# Patient Record
Sex: FEMALE | Race: BLACK OR AFRICAN AMERICAN | NOT HISPANIC OR LATINO | Employment: OTHER | URBAN - METROPOLITAN AREA
[De-identification: names, ages, dates, MRNs, and addresses within clinical notes are randomized per-mention and may not be internally consistent; named-entity substitution may affect disease eponyms.]

---

## 2018-01-15 DIAGNOSIS — Z36.89 ENCOUNTER TO ESTABLISH GESTATIONAL AGE USING ULTRASOUND: Primary | ICD-10-CM

## 2018-01-24 ENCOUNTER — INITIAL PRENATAL (OUTPATIENT)
Dept: OBSTETRICS AND GYNECOLOGY | Facility: CLINIC | Age: 23
End: 2018-01-24

## 2018-01-24 ENCOUNTER — TRANSCRIBE ORDERS (OUTPATIENT)
Dept: LAB | Facility: HOSPITAL | Age: 23
End: 2018-01-24

## 2018-01-24 ENCOUNTER — APPOINTMENT (OUTPATIENT)
Dept: LAB | Facility: HOSPITAL | Age: 23
End: 2018-01-24

## 2018-01-24 VITALS — WEIGHT: 222 LBS | SYSTOLIC BLOOD PRESSURE: 126 MMHG | DIASTOLIC BLOOD PRESSURE: 82 MMHG | BODY MASS INDEX: 38.11 KG/M2

## 2018-01-24 DIAGNOSIS — Z98.891 PREVIOUS CESAREAN SECTION: ICD-10-CM

## 2018-01-24 DIAGNOSIS — Z00.00 ROUTINE GENERAL MEDICAL EXAMINATION AT A HEALTH CARE FACILITY: Primary | ICD-10-CM

## 2018-01-24 DIAGNOSIS — Z34.82 PRENATAL CARE, SUBSEQUENT PREGNANCY, SECOND TRIMESTER: Primary | ICD-10-CM

## 2018-01-24 DIAGNOSIS — Z36.9 ANTENATAL SCREENING ENCOUNTER: ICD-10-CM

## 2018-01-24 DIAGNOSIS — Z36.3 ENCOUNTER FOR ANTENATAL SCREENING FOR MALFORMATIONS: ICD-10-CM

## 2018-01-24 LAB
ABO GROUP BLD: NORMAL
BASOPHILS # BLD AUTO: 0.01 10*3/MM3 (ref 0–0.2)
BASOPHILS NFR BLD AUTO: 0.1 % (ref 0–1)
BILIRUB UR QL STRIP: NEGATIVE
BLD GP AB SCN SERPL QL: NEGATIVE
CLARITY UR: CLEAR
COLOR UR: YELLOW
DEPRECATED RDW RBC AUTO: 41.5 FL (ref 37–54)
EOSINOPHIL # BLD AUTO: 0.08 10*3/MM3 (ref 0–0.3)
EOSINOPHIL NFR BLD AUTO: 1 % (ref 0–3)
ERYTHROCYTE [DISTWIDTH] IN BLOOD BY AUTOMATED COUNT: 13.8 % (ref 11.3–14.5)
GLUCOSE UR STRIP-MCNC: NEGATIVE MG/DL
HBA1C MFR BLD: 6.1 % (ref 4.8–5.6)
HBV SURFACE AG SERPL QL IA: NORMAL
HCT VFR BLD AUTO: 37.1 % (ref 34.5–44)
HCV AB SER DONR QL: NORMAL
HGB BLD-MCNC: 12.2 G/DL (ref 11.5–15.5)
HGB UR QL STRIP.AUTO: NEGATIVE
HIV1+2 AB SER QL: NORMAL
IMM GRANULOCYTES # BLD: 0.1 10*3/MM3 (ref 0–0.03)
IMM GRANULOCYTES NFR BLD: 1.2 % (ref 0–0.6)
KETONES UR QL STRIP: NEGATIVE
LEUKOCYTE ESTERASE UR QL STRIP.AUTO: NEGATIVE
LYMPHOCYTES # BLD AUTO: 2.32 10*3/MM3 (ref 0.6–4.8)
LYMPHOCYTES NFR BLD AUTO: 28.9 % (ref 24–44)
MCH RBC QN AUTO: 27.6 PG (ref 27–31)
MCHC RBC AUTO-ENTMCNC: 32.9 G/DL (ref 32–36)
MCV RBC AUTO: 83.9 FL (ref 80–99)
MONOCYTES # BLD AUTO: 0.42 10*3/MM3 (ref 0–1)
MONOCYTES NFR BLD AUTO: 5.2 % (ref 0–12)
NEUTROPHILS # BLD AUTO: 5.09 10*3/MM3 (ref 1.5–8.3)
NEUTROPHILS NFR BLD AUTO: 63.6 % (ref 41–71)
NITRITE UR QL STRIP: NEGATIVE
PH UR STRIP.AUTO: 6 [PH] (ref 5–8)
PLATELET # BLD AUTO: 237 10*3/MM3 (ref 150–450)
PMV BLD AUTO: 10.7 FL (ref 6–12)
PROT UR QL STRIP: NEGATIVE
RBC # BLD AUTO: 4.42 10*6/MM3 (ref 3.89–5.14)
RH BLD: POSITIVE
RUBV IGG SER QL: NORMAL
RUBV IGG SER-ACNC: 131.8 IU/ML
SP GR UR STRIP: 1.02 (ref 1–1.03)
TSH SERPL DL<=0.05 MIU/L-ACNC: 1.01 MIU/ML (ref 0.35–5.35)
UROBILINOGEN UR QL STRIP: NORMAL
WBC NRBC COR # BLD: 8.02 10*3/MM3 (ref 3.5–10.8)

## 2018-01-24 PROCEDURE — 83036 HEMOGLOBIN GLYCOSYLATED A1C: CPT | Performed by: OBSTETRICS & GYNECOLOGY

## 2018-01-24 PROCEDURE — 84702 CHORIONIC GONADOTROPIN TEST: CPT | Performed by: OBSTETRICS & GYNECOLOGY

## 2018-01-24 PROCEDURE — 82105 ALPHA-FETOPROTEIN SERUM: CPT | Performed by: OBSTETRICS & GYNECOLOGY

## 2018-01-24 PROCEDURE — 81003 URINALYSIS AUTO W/O SCOPE: CPT | Performed by: OBSTETRICS & GYNECOLOGY

## 2018-01-24 PROCEDURE — 82677 ASSAY OF ESTRIOL: CPT | Performed by: OBSTETRICS & GYNECOLOGY

## 2018-01-24 PROCEDURE — 86803 HEPATITIS C AB TEST: CPT | Performed by: OBSTETRICS & GYNECOLOGY

## 2018-01-24 PROCEDURE — 85025 COMPLETE CBC W/AUTO DIFF WBC: CPT | Performed by: OBSTETRICS & GYNECOLOGY

## 2018-01-24 PROCEDURE — 86850 RBC ANTIBODY SCREEN: CPT | Performed by: OBSTETRICS & GYNECOLOGY

## 2018-01-24 PROCEDURE — 99214 OFFICE O/P EST MOD 30 MIN: CPT | Performed by: OBSTETRICS & GYNECOLOGY

## 2018-01-24 PROCEDURE — G0432 EIA HIV-1/HIV-2 SCREEN: HCPCS | Performed by: OBSTETRICS & GYNECOLOGY

## 2018-01-24 PROCEDURE — 86901 BLOOD TYPING SEROLOGIC RH(D): CPT | Performed by: OBSTETRICS & GYNECOLOGY

## 2018-01-24 PROCEDURE — 36415 COLL VENOUS BLD VENIPUNCTURE: CPT | Performed by: OBSTETRICS & GYNECOLOGY

## 2018-01-24 PROCEDURE — 84443 ASSAY THYROID STIM HORMONE: CPT | Performed by: OBSTETRICS & GYNECOLOGY

## 2018-01-24 PROCEDURE — 80081 OBSTETRIC PANEL INC HIV TSTG: CPT | Performed by: OBSTETRICS & GYNECOLOGY

## 2018-01-24 PROCEDURE — 87340 HEPATITIS B SURFACE AG IA: CPT | Performed by: OBSTETRICS & GYNECOLOGY

## 2018-01-24 PROCEDURE — 86900 BLOOD TYPING SEROLOGIC ABO: CPT | Performed by: OBSTETRICS & GYNECOLOGY

## 2018-01-24 PROCEDURE — 86762 RUBELLA ANTIBODY: CPT | Performed by: OBSTETRICS & GYNECOLOGY

## 2018-01-24 PROCEDURE — 86336 INHIBIN A: CPT | Performed by: OBSTETRICS & GYNECOLOGY

## 2018-01-24 RX ORDER — PNV NO.95/FERROUS FUM/FOLIC AC 28MG-0.8MG
1 TABLET ORAL DAILY
Qty: 30 TABLET | Refills: 12 | Status: SHIPPED | OUTPATIENT
Start: 2018-01-24 | End: 2018-02-28 | Stop reason: ALTCHOICE

## 2018-01-24 NOTE — PROGRESS NOTES
@SUBJECTIVEBEGIN  Chief Complaint   Patient presents with   • Initial Prenatal Visit     c/o heartburn, nausea that comes and goes, LMP-1st week of 2017       Marcela Perales is a 22 y.o. year old .  Patient's last menstrual period was 10/06/2017..  She presents to be seen to initiate prenatal care.  She complains of breast tenderness, morning sickness and nausea. These symptoms have been occurring for approximately 3 months.  She states that nothing helps to alleviate her symptoms.  Pre-existing conditions that could possibly affect the pregnancy are diabetes.      Past Medical History:   Diagnosis Date   • Acid reflux    ,   Past Surgical History:   Procedure Laterality Date   • WISDOM TOOTH EXTRACTION     ,   Family History   Problem Relation Age of Onset   • Diabetes Mother    • Hypertension Mother    • Diabetes Maternal Aunt    • Diabetes Maternal Uncle    • Diabetes Maternal Grandmother    • No Known Problems Father    • Breast cancer Neg Hx    • Ovarian cancer Neg Hx    • Uterine cancer Neg Hx    • Colon cancer Neg Hx    ,   Social History   Substance Use Topics   • Smoking status: Former Smoker     Packs/day: 0.50     Types: Cigarettes     Quit date: 3/16/2016   • Smokeless tobacco: Never Used   • Alcohol use No   ,   (Not in a hospital admission) and Allergies:  Review of patient's allergies indicates no known allergies.    Smoking status: Former Smoker                                                              Packs/day: 0.50      Years: 0.00         Types: Cigarettes     Quit date: 3/16/2016  Smokeless status: Never Used                          The following portions of the patient's history were reviewed and updated as appropriate:vital signs, allergies, current medications, past medical history, past social history, past surgical history and problem list.    Objective     Imaging   No data reviewed    Review of Systems - History obtained from the patient  General ROS: positive for  -  weight gain  Psychological ROS: negative  ENT ROS: negative  Allergy and Immunology ROS: negative  Hematological and Lymphatic ROS: negative  Endocrine ROS: positive for - gestational diabetes last pregnancy  Breast ROS: negative for breast lumps  Respiratory ROS: no cough, shortness of breath, or wheezing  Cardiovascular ROS: no chest pain or dyspnea on exertion  Gastrointestinal ROS: positive for - nausea/vomiting  Genito-Urinary ROS: no dysuria, trouble voiding, or hematuria  Musculoskeletal ROS: negative  Neurological ROS: no TIA or stroke symptoms  Dermatological ROS: negative     Physical Exam:  See Episode    Assessment/Plan   ASSESSMENT  Marcela was seen today for initial prenatal visit.    Diagnoses and all orders for this visit:    Prenatal care, subsequent pregnancy, second trimester  -     Obstetric Panel  -     Hepatitis C Antibody  -     HIV-1 / O / 2 Ag / Antibody 4th Generation  -     TSH  -     Hemoglobin A1c  -     Chlamydia trachomatis, Neisseria gonorrhoeae, Trichomonas vaginalis, PCR - Swab, Vagina  -     Urinalysis With / Culture If Indicated - Urine, Clean Catch  -     Liquid-based Pap Smear, Screening - ThinPrep Vial, Cervix    Previous  section     screening encounter  -     Maternal Screen 4    Encounter for  screening for malformations  -     [facility] [place]; Future    Other orders  -     Prenatal Vit-Fe Fumarate-FA (PRENATAL VITAMIN) 27-0.8 MG tablet; Take 1 tablet by mouth Daily.        PLAN  1. Tests ordered today:  Orders Placed This Encounter   Procedures   • Chlamydia trachomatis, Neisseria gonorrhoeae, Trichomonas vaginalis, PCR - Swab, Vagina   • [facility]     Standing Status:   Future     Standing Expiration Date:   2019     Order Specific Question:   Reason for Exam:     Answer:   Anatomical scan   • Obstetric Panel   • Hepatitis C Antibody   • HIV-1 / O / 2 Ag / Antibody 4th Generation   • TSH   •  Hemoglobin A1c   • Urinalysis With / Culture If Indicated - Urine, Clean Catch   • Maternal Screen 4     Order Specific Question:   [facility] Patient weight (lbs):     Answer:   101     2. Medications prescribed today:  New Medications Ordered This Visit   Medications   • Prenatal Vit-Fe Fumarate-FA (PRENATAL VITAMIN) 27-0.8 MG tablet     Sig: Take 1 tablet by mouth Daily.     Dispense:  30 tablet     Refill:  12     3. Information reviewed: exercise in pregnancy, nutrition in pregnancy, weight gain in pregnancy, work and travel restrictions during pregnancy, list of OTC medications acceptable in pregnancy and call coverage groups  4. Genetic testing reviewed: wants screening  5. The problem list for pregnancy was initiated today    Follow up: 4 week(s)       This note was electronically signed.    Juan Avilez MD   January 24, 2018

## 2018-01-26 LAB — RPR SER QL: NORMAL

## 2018-01-29 LAB — REF LAB TEST METHOD: NORMAL

## 2018-02-21 ENCOUNTER — APPOINTMENT (OUTPATIENT)
Dept: WOMENS IMAGING | Facility: HOSPITAL | Age: 23
End: 2018-02-21
Attending: OBSTETRICS & GYNECOLOGY

## 2018-02-28 ENCOUNTER — ROUTINE PRENATAL (OUTPATIENT)
Dept: OBSTETRICS AND GYNECOLOGY | Facility: CLINIC | Age: 23
End: 2018-02-28

## 2018-02-28 ENCOUNTER — HOSPITAL ENCOUNTER (OUTPATIENT)
Dept: WOMENS IMAGING | Facility: HOSPITAL | Age: 23
Discharge: HOME | End: 2018-02-28
Attending: OBSTETRICS & GYNECOLOGY | Admitting: OBSTETRICS & GYNECOLOGY

## 2018-02-28 ENCOUNTER — OFFICE VISIT (OUTPATIENT)
Dept: OBSTETRICS AND GYNECOLOGY | Facility: HOSPITAL | Age: 23
End: 2018-02-28

## 2018-02-28 VITALS
DIASTOLIC BLOOD PRESSURE: 71 MMHG | SYSTOLIC BLOOD PRESSURE: 117 MMHG | BODY MASS INDEX: 38.35 KG/M2 | HEIGHT: 65 IN | WEIGHT: 230.2 LBS

## 2018-02-28 VITALS — WEIGHT: 234 LBS | DIASTOLIC BLOOD PRESSURE: 80 MMHG | SYSTOLIC BLOOD PRESSURE: 122 MMHG | BODY MASS INDEX: 39.55 KG/M2

## 2018-02-28 DIAGNOSIS — O34.219 PREVIOUS CESAREAN DELIVERY AFFECTING PREGNANCY: ICD-10-CM

## 2018-02-28 DIAGNOSIS — Z3A.20 20 WEEKS GESTATION OF PREGNANCY: Primary | ICD-10-CM

## 2018-02-28 DIAGNOSIS — Z36.9 ANTENATAL SCREENING ENCOUNTER: ICD-10-CM

## 2018-02-28 DIAGNOSIS — Z36.3 ENCOUNTER FOR ANTENATAL SCREENING FOR MALFORMATIONS: ICD-10-CM

## 2018-02-28 DIAGNOSIS — Z34.82 PRENATAL CARE, SUBSEQUENT PREGNANCY, SECOND TRIMESTER: Primary | ICD-10-CM

## 2018-02-28 PROCEDURE — 76811 OB US DETAILED SNGL FETUS: CPT

## 2018-02-28 PROCEDURE — 99212 OFFICE O/P EST SF 10 MIN: CPT | Performed by: OBSTETRICS & GYNECOLOGY

## 2018-02-28 PROCEDURE — 76811 OB US DETAILED SNGL FETUS: CPT | Performed by: OBSTETRICS & GYNECOLOGY

## 2018-02-28 RX ORDER — VITAMIN A ACETATE, .BETA.-CAROTENE, ASCORBIC ACID, CHOLECALCIFEROL, .ALPHA.-TOCOPHEROL ACETATE, DL-, THIAMINE MONONITRATE, RIBOFLAVIN, NIACINAMIDE, PYRIDOXINE HYDROCHLORIDE, FOLIC ACID, CYANOCOBALAMIN, CALCIUM CARBONATE, FERROUS FUMARATE, ZINC OXIDE, AND CUPRIC OXIDE 2000; 2000; 120; 400; 22; 1.84; 3; 20; 10; 1; 12; 200; 27; 25; 2 [IU]/1; [IU]/1; MG/1; [IU]/1; MG/1; MG/1; MG/1; MG/1; MG/1; MG/1; UG/1; MG/1; MG/1; MG/1; MG/1
1 TABLET ORAL DAILY
Refills: 1 | COMMUNITY
Start: 2018-02-13 | End: 2021-11-02

## 2018-02-28 NOTE — PROGRESS NOTES
Denies problems. Had negative MSAFP4. States still has glucometer from previous pregnancy and occasionally checks glucose values (). To see Dr. Avilez today.

## 2018-02-28 NOTE — PROGRESS NOTES
Lab Results   Component Value Date    ABORH B Rh Positive 2016    LABANTI Negative 2016       Chief Complaint   Patient presents with   • Routine Prenatal Visit     US at [facility] today    • Back Pain       Today Marcela complains of right hip pain  See ob flowsheet for physical exam.    Prenatal Assessment  Fetal Heart Rate: 153  Fundal Height (cm): 25 cm  Movement: Present  Prenatal Vitals  BP: 122/80  Weight: 106 kg (234 lb)  Vaginal Drainage  Draining Fluid: No  Edema  LLE Edema: None  RLE Edema: None  Facial Edema: None     Tests done today: U/S to complete the anatomic screening - U/S report is not available for review at this time  At the time of the next visit, she will need to have GCT    Impression:   Encounter Diagnoses   Name Primary?   • Prenatal care, subsequent pregnancy, second trimester Yes   • Previous  delivery affecting pregnancy    •  screening encounter        Plan: Return 2 weeks, Glucola next visit    This note was electronically signed.    Juan Avilez MD

## 2018-04-10 ENCOUNTER — ROUTINE PRENATAL (OUTPATIENT)
Dept: OBSTETRICS AND GYNECOLOGY | Facility: CLINIC | Age: 23
End: 2018-04-10

## 2018-04-10 VITALS — BODY MASS INDEX: 39.38 KG/M2 | SYSTOLIC BLOOD PRESSURE: 122 MMHG | WEIGHT: 233 LBS | DIASTOLIC BLOOD PRESSURE: 70 MMHG

## 2018-04-10 DIAGNOSIS — Z34.83 PRENATAL CARE, SUBSEQUENT PREGNANCY, THIRD TRIMESTER: Primary | ICD-10-CM

## 2018-04-10 DIAGNOSIS — O26.843 UTERINE SIZE DATE DISCREPANCY PREGNANCY, THIRD TRIMESTER: ICD-10-CM

## 2018-04-10 DIAGNOSIS — O34.219 PREVIOUS CESAREAN DELIVERY AFFECTING PREGNANCY: ICD-10-CM

## 2018-04-10 PROCEDURE — 99212 OFFICE O/P EST SF 10 MIN: CPT | Performed by: OBSTETRICS & GYNECOLOGY

## 2018-04-16 ENCOUNTER — HOSPITAL ENCOUNTER (OUTPATIENT)
Facility: HOSPITAL | Age: 23
Discharge: HOME | End: 2018-04-17
Attending: OBSTETRICS & GYNECOLOGY | Admitting: OBSTETRICS & GYNECOLOGY

## 2018-04-16 LAB
DEPRECATED RDW RBC AUTO: 40.3 FL (ref 37–54)
ERYTHROCYTE [DISTWIDTH] IN BLOOD BY AUTOMATED COUNT: 13.3 % (ref 11.3–14.5)
HCT VFR BLD AUTO: 34.6 % (ref 34.5–44)
HGB BLD-MCNC: 11.4 G/DL (ref 11.5–15.5)
MCH RBC QN AUTO: 27.5 PG (ref 27–31)
MCHC RBC AUTO-ENTMCNC: 32.9 G/DL (ref 32–36)
MCV RBC AUTO: 83.6 FL (ref 80–99)
PLATELET # BLD AUTO: 244 10*3/MM3 (ref 150–450)
PMV BLD AUTO: 11.1 FL (ref 6–12)
RBC # BLD AUTO: 4.14 10*6/MM3 (ref 3.89–5.14)
WBC NRBC COR # BLD: 10.78 10*3/MM3 (ref 3.5–10.8)

## 2018-04-16 PROCEDURE — 85027 COMPLETE CBC AUTOMATED: CPT | Performed by: OBSTETRICS & GYNECOLOGY

## 2018-04-16 PROCEDURE — 59025 FETAL NON-STRESS TEST: CPT

## 2018-04-17 VITALS
BODY MASS INDEX: 38.93 KG/M2 | WEIGHT: 228 LBS | TEMPERATURE: 98.3 F | SYSTOLIC BLOOD PRESSURE: 130 MMHG | HEIGHT: 64 IN | HEART RATE: 100 BPM | DIASTOLIC BLOOD PRESSURE: 75 MMHG | RESPIRATION RATE: 16 BRPM

## 2018-04-17 PROCEDURE — 99213 OFFICE O/P EST LOW 20 MIN: CPT | Performed by: OBSTETRICS & GYNECOLOGY

## 2018-04-17 PROCEDURE — G0463 HOSPITAL OUTPT CLINIC VISIT: HCPCS

## 2018-04-17 NOTE — H&P
[facility]  Obstetric History and Physical    Chief Complaint   Patient presents with   • Rectal Bleeding     bleeding from rectum with BM on Saturday and today.       Subjective     Patient is a 23 y.o. female  currently at 29w4d, who presents with a concern after having 2 episodes of rectal bleeding during straining with a bowel movement.  She thought it might be hemorrhoids, but was scared with the bright red bleeding and wanted to be sure.  She has never had this before.  She does report that she is more constipated lately with sometimes going 2-3 days between stools.  She denies pain, feeling faint or more tired than usual.   She does not notice bleeding other than the two times that she mentioned.  Her CBC is not significantly abnormal.          The following portions of the patients history were reviewed and updated as appropriate: current medications, allergies, past medical history, past surgical history, past family history, past social history and problem list .       Prenatal Information:   Maternal Prenatal Labs  Blood Type No results found for: ABO   Rh Status No results found for: RH   Antibody Screen No results found for: ABSCRN   Gonnorhea No results found for: GCCX   Chlamydia No results found for: CLAMYDCU   RPR No results found for: RPR   Syphilis Antibody No results found for: SYPHILIS   Rubella No results found for: RUBELLAIGGIN   Hepatitis B Surface Antigen No results found for: HEPBSAG   HIV-1 Antibody No results found for: LABHIV1   Hepatitis C Antibody No results found for: HEPCAB   Rapid Urin Drug Screen No results found for: AMPMETHU, BARBITSCNUR, LABBENZSCN, LABMETHSCN, LABOPIASCN, THCURSCR, COCAINEUR, AMPHETSCREEN, PROPOXSCN, BUPRENORSCNU, METAMPSCNUR, OXYCODONESCN, TRICYCLICSCN   Group B Strep Culture No results found for: GBSANTIGEN           External Prenatal Results         Pregnancy Outside Results - these were transcribed from office records.  See scanned records for  details. Date Time   Hgb  11.4 g/dL (L) 18 2317   Hct  34.6 % 18 2317   ABO  B  18 1249   Rh  Positive  18 1249   Antibody Screen  Negative  18 1249   Glucose Fasting GTT      Glucose Tolerance Test 1 hour ^ 218  16    Glucose Tolerance Test 3 hour      Gonorrhea (discrete) ^ Negative  16    Chlamydia (discrete) ^ Negative  16    RPR  Non-Reactive  18 1249   VDRL      Syphillis Antibody      Rubella  131.8 IU/mL 18 1249      Immune  18 1249   HBsAg  Non-Reactive  18 1249   Herpes Simplex Virus PCR      Herpes Simplex VIrus Culture      HIV  Non-Reactive  18 1249   Hep C RNA Quant PCR      Hep C Antibody      AFP      Group B Strep ^ Negative  16    GBS Susceptibility to Clindamycin      GBS Susceptibility to Eythromycin      Fetal Fibronectin      Genetic Testing, Maternal Blood      Drug Screening Date Time   Urine Drug Screen ^ negative  16    Amphetamine Screen  Negative  16 1256   Barbiturate Screen  Negative  16 1256   Benzodiazepine Screen  Negative  16 1256   Methadone Screen  Negative  16 1256   Phencyclidine Screen  Negative  16 1256   Opiates Screen  Negative  16 1256   THC Screen  Negative  16 1256   Cocaine Screen      Propoxyphene Screen  Negative  16 1256   Buprenorphine Screen  Negative  16 1256   Methamphetamine Screen      Oxycodone Screen  Negative  16 1256   Tryicyclic Antidepressants Screen  Negative  16 1256             Legend: ^: Historical                       Past OB History:     Obstetric History       T1      L1     SAB0   TAB0   Ectopic0   Molar0   Multiple0   Live Births1       # Outcome Date GA Lbr Michele/2nd Weight Sex Delivery Anes PTL Lv   2 Current            1 Term 16 38w6d  3718 g (8 lb 3.2 oz) M CS-LTranv EPI N AYO      Name: JOSAFAT HACKETT      Complications: Failure to Progress in First Stage      Apgar1:   8                Apgar5: 9          Past Medical History: Past Medical History:   Diagnosis Date   • Acid reflux       Past Surgical History Past Surgical History:   Procedure Laterality Date   •  SECTION PRIMARY  2016   • WISDOM TOOTH EXTRACTION        Family History: Family History   Problem Relation Age of Onset   • Diabetes Mother    • Hypertension Mother    • Diabetes Maternal Aunt    • Diabetes Maternal Uncle    • Diabetes Maternal Grandmother    • No Known Problems Father    • Breast cancer Neg Hx    • Ovarian cancer Neg Hx    • Uterine cancer Neg Hx    • Colon cancer Neg Hx       Social History:  reports that she quit smoking about 2 years ago. Her smoking use included Cigarettes. She smoked 0.50 packs per day. She has never used smokeless tobacco.   reports that she does not drink alcohol.   reports that she does not use drugs.        Review of Systems  Denies fever, HA, CP, Shortness of air, muscle weakness,                                             and rashes      Objective     Vital Signs Range for the last 24 hours  Temperature: Temp:  [98.3 °F (36.8 °C)] 98.3 °F (36.8 °C)   Temp Source: Temp src: Oral   BP:  130/75   Pulse:  100   Respirations: Resp:  [16] 16   SPO2:     O2 Amount (l/min):     O2 Devices     Weight: Weight:  [103 kg (228 lb)] 103 kg (228 lb)     Physical Examination: General appearance - alert, well appearing, and in no distress and oriented to person, place, and time  Chest - clear to auscultation, no wheezes, rales or rhonchi, symmetric air entry  Heart - S1 and S2 normal, no murmurs noted  Abdomen - soft, nontender, nondistended, no masses or organomegaly  no rebound tenderness noted  bowel sounds normal  No guarding, No RUQ pain  Extremities - Non-tender                          Fetal Heart Rate Assessment   Method:     Beats/min:     Baseline:  130s   Varibility:     Accels:     Decels:     Tracing Category:       Uterine Assessment   Method:     Frequency (min):   none   Ctx Count in 10 min:     Duration:     Intensity:     Intensity by IUPC:     Resting Tone:     Resting Tone by IUPC:           Laboratory Results:   Lab Results   Component Value Date     04/16/2018    HGB 11.4 (L) 04/16/2018    HCT 34.6 04/16/2018    WBC 10.78 04/16/2018             Assessment:  1. Intrauterine pregnancy at 29w4d weeks gestation with reassuring fetal status  2. Hemorrhoids     Plan:  1. Reassuring fetal status  2. Hemorrhoids - Recommend taking 1 to 2 daily of Colace to keep from constiaption                               CBC is stable.     3.  All questions have been answered.  4.  D/C home with routine follow-up      Federico Gordon MD  4/17/2018  12:12 AM

## 2018-04-24 ENCOUNTER — OFFICE VISIT (OUTPATIENT)
Dept: OBSTETRICS AND GYNECOLOGY | Facility: HOSPITAL | Age: 23
End: 2018-04-24

## 2018-04-24 ENCOUNTER — ROUTINE PRENATAL (OUTPATIENT)
Dept: OBSTETRICS AND GYNECOLOGY | Facility: CLINIC | Age: 23
End: 2018-04-24

## 2018-04-24 ENCOUNTER — HOSPITAL ENCOUNTER (OUTPATIENT)
Dept: WOMENS IMAGING | Facility: HOSPITAL | Age: 23
Discharge: HOME | End: 2018-04-24
Attending: OBSTETRICS & GYNECOLOGY | Admitting: OBSTETRICS & GYNECOLOGY

## 2018-04-24 VITALS — SYSTOLIC BLOOD PRESSURE: 130 MMHG | DIASTOLIC BLOOD PRESSURE: 80 MMHG | WEIGHT: 243 LBS | BODY MASS INDEX: 41.71 KG/M2

## 2018-04-24 VITALS — WEIGHT: 240 LBS | DIASTOLIC BLOOD PRESSURE: 77 MMHG | BODY MASS INDEX: 41.2 KG/M2 | SYSTOLIC BLOOD PRESSURE: 119 MMHG

## 2018-04-24 DIAGNOSIS — O26.843 UTERINE SIZE-DATE DISCREPANCY IN THIRD TRIMESTER: Primary | ICD-10-CM

## 2018-04-24 DIAGNOSIS — O34.219 PREVIOUS CESAREAN DELIVERY AFFECTING PREGNANCY: ICD-10-CM

## 2018-04-24 DIAGNOSIS — O40.9XX0 POLYHYDRAMNIOS AFFECTING PREGNANCY: ICD-10-CM

## 2018-04-24 DIAGNOSIS — O26.843 UTERINE SIZE DATE DISCREPANCY PREGNANCY, THIRD TRIMESTER: ICD-10-CM

## 2018-04-24 DIAGNOSIS — Z34.83 PRENATAL CARE, SUBSEQUENT PREGNANCY, THIRD TRIMESTER: Primary | ICD-10-CM

## 2018-04-24 DIAGNOSIS — E66.01 MORBID OBESITY WITH BODY MASS INDEX (BMI) OF 40.0 TO 44.9 IN ADULT (HCC): ICD-10-CM

## 2018-04-24 DIAGNOSIS — O24.410 DIET CONTROLLED GESTATIONAL DIABETES MELLITUS (GDM) IN THIRD TRIMESTER: ICD-10-CM

## 2018-04-24 PROCEDURE — 99212 OFFICE O/P EST SF 10 MIN: CPT | Performed by: OBSTETRICS & GYNECOLOGY

## 2018-04-24 PROCEDURE — 76816 OB US FOLLOW-UP PER FETUS: CPT | Performed by: OBSTETRICS & GYNECOLOGY

## 2018-04-24 PROCEDURE — 76816 OB US FOLLOW-UP PER FETUS: CPT

## 2018-04-24 NOTE — PROGRESS NOTES
Lab Results   Component Value Date    ABORH B Rh Positive 2016    LABANTI Negative 2016       Chief Complaint   Patient presents with   • Routine Prenatal Visit     US at [facility] today       Today Marcela complains of nothing but she is not on a diabetic diet, she is doing BS qid and will review her values next week  See ob flowsheet for physical exam.    Prenatal Assessment  Fetal Heart Rate: 135  Movement: Present  Prenatal Vitals  BP: 130/80  Weight: 110 kg (243 lb)  Urine Glucose/Protein  Urine Glucose: 3+  Urine Protein: Negative     Tests done today: U/S for EFW - at [facility]  At the time of the next visit, she will need to have none    Impression:   Encounter Diagnoses   Name Primary?   • Prenatal care, subsequent pregnancy, third trimester Yes   • Previous  delivery affecting pregnancy    • Diet controlled gestational diabetes mellitus (GDM) in third trimester        Plan: Return 1 week    This note was electronically signed.    Juan Avilez MD

## 2018-04-24 NOTE — PROGRESS NOTES
"Pt denies all s/s PTL.  Denies other problems.  Denies GDM this pregnancy but \"still checking glucoses.\"  Reports \"fasting glucoses in low 90's, post meals 120's.\"  To see Devante next.  "

## 2018-05-08 ENCOUNTER — ROUTINE PRENATAL (OUTPATIENT)
Dept: OBSTETRICS AND GYNECOLOGY | Facility: CLINIC | Age: 23
End: 2018-05-08

## 2018-05-08 VITALS — DIASTOLIC BLOOD PRESSURE: 68 MMHG | SYSTOLIC BLOOD PRESSURE: 120 MMHG | WEIGHT: 240.2 LBS | BODY MASS INDEX: 41.23 KG/M2

## 2018-05-08 DIAGNOSIS — E66.01 MORBID OBESITY WITH BODY MASS INDEX (BMI) OF 40.0 TO 44.9 IN ADULT (HCC): ICD-10-CM

## 2018-05-08 DIAGNOSIS — O34.219 PREVIOUS CESAREAN DELIVERY AFFECTING PREGNANCY: Primary | ICD-10-CM

## 2018-05-08 DIAGNOSIS — O24.410 DIET CONTROLLED GESTATIONAL DIABETES MELLITUS (GDM) IN THIRD TRIMESTER: ICD-10-CM

## 2018-05-08 DIAGNOSIS — O40.9XX0 POLYHYDRAMNIOS AFFECTING PREGNANCY: ICD-10-CM

## 2018-05-08 PROCEDURE — 99213 OFFICE O/P EST LOW 20 MIN: CPT | Performed by: OBSTETRICS & GYNECOLOGY

## 2018-05-08 NOTE — PROGRESS NOTES
Lab Results   Component Value Date    ABORH B Rh Positive 2016    LABANTI Negative 2016       Chief Complaint   Patient presents with   • Routine Prenatal Visit     no problem       Today Marcela has no specific complaints  See ob flowsheet for physical exam.    Prenatal Assessment  Fetal Heart Rate: 164  Fundal Height (cm): 31 cm  Movement: Present  Presentation: Vertex  Prenatal Vitals  BP: 122/70  Weight: 106 kg (233 lb)  Urine Glucose/Protein  Urine Glucose: Negative  Urine Protein: Negative  Vaginal Drainage  Draining Fluid: No  Edema  LLE Edema: None  RLE Edema: None  Facial Edema: None     Tests done today: none  At the time of the next visit, she will need to have  At [facility]    Impression:   Encounter Diagnoses   Name Primary?   • Prenatal care, subsequent pregnancy, third trimester Yes   • Previous  delivery affecting pregnancy    • Uterine size date discrepancy pregnancy, third trimester        Plan: Return 2 weeks, patient wants to sign BTL papers, pt is monitoring BS but did not do 1 hr Glucola    This note was electronically signed.    Juan Avilez MD   
complains of pain/discomfort

## 2018-05-08 NOTE — PROGRESS NOTES
Lab Results   Component Value Date    ABORH B Rh Positive 2016    LABANTI Negative 2016       Chief Complaint   Patient presents with   • Routine Prenatal Visit     pt states no c/o       Today Marcela has no specific complaints  See ob flowsheet for physical exam.    Prenatal Assessment  Fetal Heart Rate: 143  Prenatal Vitals  BP: 120/68  Weight: 109 kg (240 lb 3.2 oz)     Tests done today: none  At the time of the next visit, she will need to have none    Impression:   Encounter Diagnoses   Name Primary?   • Previous  delivery affecting pregnancy Yes   • Morbid obesity with body mass index (BMI) of 40.0 to 44.9 in adult    • Diet controlled gestational diabetes mellitus (GDM) in third trimester    • Polyhydramnios affecting pregnancy        Plan: Return 2 weeks, Blood sugars are in good control by history     This note was electronically signed.    Juan Avilez MD

## 2018-05-16 ENCOUNTER — ROUTINE PRENATAL (OUTPATIENT)
Dept: OBSTETRICS AND GYNECOLOGY | Facility: CLINIC | Age: 23
End: 2018-05-16

## 2018-05-16 VITALS — WEIGHT: 239 LBS | SYSTOLIC BLOOD PRESSURE: 110 MMHG | BODY MASS INDEX: 41.02 KG/M2 | DIASTOLIC BLOOD PRESSURE: 70 MMHG

## 2018-05-16 DIAGNOSIS — O24.410 DIET CONTROLLED GESTATIONAL DIABETES MELLITUS (GDM) IN THIRD TRIMESTER: ICD-10-CM

## 2018-05-16 DIAGNOSIS — O34.219 PREVIOUS CESAREAN DELIVERY AFFECTING PREGNANCY: Primary | ICD-10-CM

## 2018-05-16 DIAGNOSIS — E66.01 MORBID OBESITY WITH BODY MASS INDEX (BMI) OF 40.0 TO 44.9 IN ADULT (HCC): ICD-10-CM

## 2018-05-16 DIAGNOSIS — O40.9XX0 POLYHYDRAMNIOS AFFECTING PREGNANCY: ICD-10-CM

## 2018-05-16 PROCEDURE — 99212 OFFICE O/P EST SF 10 MIN: CPT | Performed by: OBSTETRICS & GYNECOLOGY

## 2018-05-16 NOTE — PROGRESS NOTES
Lab Results   Component Value Date    ABORH B Rh Positive 2016    LABANTI Negative 2016       Chief Complaint   Patient presents with   • Routine Prenatal Visit     no c/o       Today Marcela has no specific complaints  See ob flowsheet for physical exam.    Prenatal Assessment  Fetal Heart Rate: 150  Movement: Present  Prenatal Vitals  BP: 110/70  Weight: 108 kg (239 lb)  Urine Glucose/Protein  Urine Glucose: Negative  Urine Protein: Negative     Tests done today: none  At the time of the next visit, she will need to have US at [facility]    Impression:   Encounter Diagnoses   Name Primary?   • Previous  delivery affecting pregnancy Yes   • Morbid obesity with body mass index (BMI) of 40.0 to 44.9 in adult    • Diet controlled gestational diabetes mellitus (GDM) in third trimester    • Polyhydramnios affecting pregnancy        Plan: Return 1 week, US [facility] large for dates    This note was electronically signed.    Juan Avilez MD

## 2018-05-23 ENCOUNTER — OFFICE VISIT (OUTPATIENT)
Dept: OBSTETRICS AND GYNECOLOGY | Facility: HOSPITAL | Age: 23
End: 2018-05-23

## 2018-05-23 ENCOUNTER — HOSPITAL ENCOUNTER (OUTPATIENT)
Dept: WOMENS IMAGING | Facility: HOSPITAL | Age: 23
Discharge: HOME | End: 2018-05-23
Attending: OBSTETRICS & GYNECOLOGY | Admitting: OBSTETRICS & GYNECOLOGY

## 2018-05-23 ENCOUNTER — ROUTINE PRENATAL (OUTPATIENT)
Dept: OBSTETRICS AND GYNECOLOGY | Facility: CLINIC | Age: 23
End: 2018-05-23

## 2018-05-23 ENCOUNTER — HOSPITAL ENCOUNTER (OUTPATIENT)
Facility: HOSPITAL | Age: 23
Setting detail: OBSERVATION
Discharge: HOME | End: 2018-05-24
Attending: OBSTETRICS & GYNECOLOGY | Admitting: OBSTETRICS & GYNECOLOGY

## 2018-05-23 VITALS — DIASTOLIC BLOOD PRESSURE: 65 MMHG | SYSTOLIC BLOOD PRESSURE: 120 MMHG | BODY MASS INDEX: 41.71 KG/M2 | WEIGHT: 243 LBS

## 2018-05-23 VITALS — SYSTOLIC BLOOD PRESSURE: 122 MMHG | BODY MASS INDEX: 41.75 KG/M2 | WEIGHT: 243.2 LBS | DIASTOLIC BLOOD PRESSURE: 64 MMHG

## 2018-05-23 DIAGNOSIS — O40.9XX0 POLYHYDRAMNIOS AFFECTING PREGNANCY: ICD-10-CM

## 2018-05-23 DIAGNOSIS — O24.410 DIET CONTROLLED GESTATIONAL DIABETES MELLITUS (GDM) IN THIRD TRIMESTER: ICD-10-CM

## 2018-05-23 DIAGNOSIS — O24.410 DIET CONTROLLED GESTATIONAL DIABETES MELLITUS (GDM) IN THIRD TRIMESTER: Primary | ICD-10-CM

## 2018-05-23 DIAGNOSIS — O34.219 PREVIOUS CESAREAN DELIVERY AFFECTING PREGNANCY: ICD-10-CM

## 2018-05-23 DIAGNOSIS — E66.01 MORBID OBESITY WITH BODY MASS INDEX (BMI) OF 40.0 TO 44.9 IN ADULT (HCC): ICD-10-CM

## 2018-05-23 LAB
ABO GROUP BLD: NORMAL
BLD GP AB SCN SERPL QL: NEGATIVE
DEPRECATED RDW RBC AUTO: 41 FL (ref 37–54)
ERYTHROCYTE [DISTWIDTH] IN BLOOD BY AUTOMATED COUNT: 13.5 % (ref 11.3–14.5)
GLUCOSE BLDC GLUCOMTR-MCNC: 141 MG/DL (ref 70–130)
GLUCOSE BLDC GLUCOMTR-MCNC: 92 MG/DL (ref 70–130)
HCT VFR BLD AUTO: 33 % (ref 34.5–44)
HGB BLD-MCNC: 10.8 G/DL (ref 11.5–15.5)
MCH RBC QN AUTO: 26.9 PG (ref 27–31)
MCHC RBC AUTO-ENTMCNC: 32.7 G/DL (ref 32–36)
MCV RBC AUTO: 82.3 FL (ref 80–99)
PLATELET # BLD AUTO: 213 10*3/MM3 (ref 150–450)
PMV BLD AUTO: 9.9 FL (ref 6–12)
RBC # BLD AUTO: 4.01 10*6/MM3 (ref 3.89–5.14)
RH BLD: POSITIVE
T&S EXPIRATION DATE: NORMAL
WBC NRBC COR # BLD: 8.46 10*3/MM3 (ref 3.5–10.8)

## 2018-05-23 PROCEDURE — 82962 GLUCOSE BLOOD TEST: CPT

## 2018-05-23 PROCEDURE — G0378 HOSPITAL OBSERVATION PER HR: HCPCS

## 2018-05-23 PROCEDURE — 76819 FETAL BIOPHYS PROFIL W/O NST: CPT

## 2018-05-23 PROCEDURE — 86900 BLOOD TYPING SEROLOGIC ABO: CPT | Performed by: OBSTETRICS & GYNECOLOGY

## 2018-05-23 PROCEDURE — 59025 FETAL NON-STRESS TEST: CPT

## 2018-05-23 PROCEDURE — 99213 OFFICE O/P EST LOW 20 MIN: CPT | Performed by: OBSTETRICS & GYNECOLOGY

## 2018-05-23 PROCEDURE — 85027 COMPLETE CBC AUTOMATED: CPT | Performed by: OBSTETRICS & GYNECOLOGY

## 2018-05-23 PROCEDURE — 76816 OB US FOLLOW-UP PER FETUS: CPT | Performed by: OBSTETRICS & GYNECOLOGY

## 2018-05-23 PROCEDURE — 76819 FETAL BIOPHYS PROFIL W/O NST: CPT | Performed by: OBSTETRICS & GYNECOLOGY

## 2018-05-23 PROCEDURE — 76816 OB US FOLLOW-UP PER FETUS: CPT

## 2018-05-23 PROCEDURE — 86850 RBC ANTIBODY SCREEN: CPT | Performed by: OBSTETRICS & GYNECOLOGY

## 2018-05-23 PROCEDURE — 86901 BLOOD TYPING SEROLOGIC RH(D): CPT | Performed by: OBSTETRICS & GYNECOLOGY

## 2018-05-23 RX ORDER — FAMOTIDINE 20 MG/1
20 TABLET, FILM COATED ORAL 2 TIMES DAILY
Status: DISCONTINUED | OUTPATIENT
Start: 2018-05-23 | End: 2018-05-24 | Stop reason: HOSPADM

## 2018-05-23 RX ORDER — ZOLPIDEM TARTRATE 5 MG/1
10 TABLET ORAL NIGHTLY PRN
Status: DISCONTINUED | OUTPATIENT
Start: 2018-05-23 | End: 2018-05-24 | Stop reason: HOSPADM

## 2018-05-23 RX ORDER — PRENATAL VIT/IRON FUM/FOLIC AC 27MG-0.8MG
1 TABLET ORAL DAILY
Status: DISCONTINUED | OUTPATIENT
Start: 2018-05-23 | End: 2018-05-24 | Stop reason: HOSPADM

## 2018-05-23 RX ORDER — ACETAMINOPHEN 325 MG/1
650 TABLET ORAL EVERY 4 HOURS PRN
Status: DISCONTINUED | OUTPATIENT
Start: 2018-05-23 | End: 2018-05-24 | Stop reason: HOSPADM

## 2018-05-23 RX ADMIN — PRENATAL VIT W/ FE FUMARATE-FA TAB 27-0.8 MG 1 TABLET: 27-0.8 TAB at 18:58

## 2018-05-23 RX ADMIN — FAMOTIDINE 20 MG: 20 TABLET ORAL at 20:31

## 2018-05-23 NOTE — PROGRESS NOTES
Documentation of the ultasound findings, images, and interpretations with be available in the patient's Viewpoint report which is located in the imaging tab in chart review.   yes

## 2018-05-23 NOTE — PROGRESS NOTES
"Pt denies problems with pregnancy or s/s PTL.  Reports \"fasting glucoses in 90's, 1 hour after meals 110-130.\"   Natalia managing GDM. To see OB next.  Afp4-neg.  "

## 2018-05-23 NOTE — PROGRESS NOTES
Lab Results   Component Value Date    ABORH B Rh Positive 2016    LABANTI Negative 2016       Chief Complaint   Patient presents with   • Routine Prenatal Visit     pt concerned because they told her at [facility] that her baby is measuring 38w4d and is 8lbs. pt states that FHR was 148 at [facility]       Today Marcela has no specific complaints  See ob flowsheet for physical exam.    Prenatal Assessment  Fetal Heart Rate: 148  Fundal Height (cm): 40 cm  Movement: Present  Presentation: Vertex  Prenatal Vitals  BP: 122/64  Weight: 110 kg (243 lb 3.2 oz)  Vaginal Drainage  Draining Fluid: No  Edema  LLE Edema: None  RLE Edema: None  Facial Edema: None     Tests done today: U/S for EFW - at [facility]  At the time of the next visit, she will need to have NST - here    Impression:   Encounter Diagnoses   Name Primary?   • Diet controlled gestational diabetes mellitus (GDM) in third trimester Yes   • Polyhydramnios affecting pregnancy    • Previous  delivery affecting pregnancy    • Morbid obesity with body mass index (BMI) of 40.0 to 44.9 in adult        Plan: admit to L/D to monitor blood sugars and NST's    This note was electronically signed.    Juan Avilez MD

## 2018-05-24 VITALS
SYSTOLIC BLOOD PRESSURE: 111 MMHG | HEIGHT: 64 IN | BODY MASS INDEX: 41.48 KG/M2 | DIASTOLIC BLOOD PRESSURE: 63 MMHG | HEART RATE: 112 BPM | WEIGHT: 243 LBS | RESPIRATION RATE: 16 BRPM | TEMPERATURE: 98 F

## 2018-05-24 PROBLEM — O36.63X0 FETAL MACROSOMIA IN THIRD TRIMESTER: Status: ACTIVE | Noted: 2018-05-24

## 2018-05-24 LAB
GLUCOSE BLDC GLUCOMTR-MCNC: 126 MG/DL (ref 70–130)
GLUCOSE BLDC GLUCOMTR-MCNC: 129 MG/DL (ref 70–130)
GLUCOSE BLDC GLUCOMTR-MCNC: 98 MG/DL (ref 70–130)

## 2018-05-24 PROCEDURE — G0378 HOSPITAL OBSERVATION PER HR: HCPCS

## 2018-05-24 PROCEDURE — 99213 OFFICE O/P EST LOW 20 MIN: CPT | Performed by: OBSTETRICS & GYNECOLOGY

## 2018-05-24 PROCEDURE — 59025 FETAL NON-STRESS TEST: CPT

## 2018-05-24 PROCEDURE — 82962 GLUCOSE BLOOD TEST: CPT

## 2018-05-24 RX ADMIN — FAMOTIDINE 20 MG: 20 TABLET ORAL at 08:36

## 2018-05-24 RX ADMIN — PRENATAL VIT W/ FE FUMARATE-FA TAB 27-0.8 MG 1 TABLET: 27-0.8 TAB at 08:36

## 2018-05-28 ENCOUNTER — HOSPITAL ENCOUNTER (OUTPATIENT)
Facility: HOSPITAL | Age: 23
Setting detail: OBSERVATION
Discharge: HOME | End: 2018-05-28
Attending: OBSTETRICS & GYNECOLOGY | Admitting: OBSTETRICS & GYNECOLOGY

## 2018-05-28 VITALS
HEIGHT: 64 IN | WEIGHT: 243 LBS | HEART RATE: 102 BPM | SYSTOLIC BLOOD PRESSURE: 127 MMHG | DIASTOLIC BLOOD PRESSURE: 68 MMHG | RESPIRATION RATE: 18 BRPM | TEMPERATURE: 98.5 F | BODY MASS INDEX: 41.48 KG/M2

## 2018-05-28 DIAGNOSIS — O40.9XX0 POLYHYDRAMNIOS AFFECTING PREGNANCY: ICD-10-CM

## 2018-05-28 DIAGNOSIS — E66.01 MORBID OBESITY WITH BODY MASS INDEX (BMI) OF 40.0 TO 44.9 IN ADULT (HCC): ICD-10-CM

## 2018-05-28 DIAGNOSIS — O34.219 PREVIOUS CESAREAN DELIVERY AFFECTING PREGNANCY: ICD-10-CM

## 2018-05-28 DIAGNOSIS — O24.410 DIET CONTROLLED GESTATIONAL DIABETES MELLITUS (GDM) IN THIRD TRIMESTER: ICD-10-CM

## 2018-05-28 DIAGNOSIS — O47.03 FALSE LABOR BEFORE 37 COMPLETED WEEKS OF GESTATION IN THIRD TRIMESTER: Primary | ICD-10-CM

## 2018-05-28 LAB
BILIRUB UR QL STRIP: NEGATIVE
CLARITY UR: CLEAR
COLOR UR: YELLOW
GLUCOSE BLDC GLUCOMTR-MCNC: 136 MG/DL (ref 70–130)
GLUCOSE UR STRIP-MCNC: ABNORMAL MG/DL
HGB UR QL STRIP.AUTO: NEGATIVE
KETONES UR QL STRIP: NEGATIVE
LEUKOCYTE ESTERASE UR QL STRIP.AUTO: NEGATIVE
NITRITE UR QL STRIP: NEGATIVE
PH UR STRIP.AUTO: 6.5 [PH] (ref 5–8)
PROT UR QL STRIP: NEGATIVE
SP GR UR STRIP: <=1.005 (ref 1–1.03)
UROBILINOGEN UR QL STRIP: ABNORMAL

## 2018-05-28 PROCEDURE — 81003 URINALYSIS AUTO W/O SCOPE: CPT | Performed by: OBSTETRICS & GYNECOLOGY

## 2018-05-28 PROCEDURE — 59025 FETAL NON-STRESS TEST: CPT | Performed by: OBSTETRICS & GYNECOLOGY

## 2018-05-28 PROCEDURE — 82962 GLUCOSE BLOOD TEST: CPT

## 2018-05-28 PROCEDURE — G0378 HOSPITAL OBSERVATION PER HR: HCPCS

## 2018-05-28 PROCEDURE — 59025 FETAL NON-STRESS TEST: CPT

## 2018-05-28 PROCEDURE — 99218 PR INITIAL OBSERVATION CARE/DAY 30 MINUTES: CPT | Performed by: OBSTETRICS & GYNECOLOGY

## 2018-05-28 RX ORDER — ZOLPIDEM TARTRATE 5 MG/1
10 TABLET ORAL ONCE
Status: COMPLETED | OUTPATIENT
Start: 2018-05-28 | End: 2018-05-28

## 2018-05-28 RX ADMIN — ZOLPIDEM TARTRATE 10 MG: 5 TABLET ORAL at 03:21

## 2018-05-28 NOTE — H&P
"[facility]  1995  [id number]  [id number]    CC: contractions  HPI:  Patient is 23 y.o. black female   currently at 35w3d  Presents with c/o uterine contractions.  Intermittent, radiates to back, rates 7/10, denies assoc vag bleeding or SROM.  Good FM.  PNC comp by prev , obesity, gest diabetes (diet controlled), and hydramnios at last US.    PMH:  Current meds PNV  Illnesses none  Surgeries oral surg,   Allergies NKDA    Past OB History:       Obstetric History       T1      L1     SAB0   TAB0   Ectopic0   Molar0   Multiple0   Live Births1       # Outcome Date GA Lbr Michele/2nd Weight Sex Delivery Anes PTL Lv   2 Current            1 Term 16 38w6d  3718 g (8 lb 3.2 oz) M CS-LTranv EPI N AYO      Name: JOSAFAT HACKETT      Complications: Failure to Progress in First Stage      Apgar1:  8                Apgar5: 9               SH: tob neg , EtOH neg, drugs neg  FH: heart dz neg , diabetes pos , cancer neg    General ROS: HA.   All other systems reviewed and are negative.      Physical Examination: General appearance - alert, well appearing, and in no distress  Vital signs - /68   Pulse 102   Temp 98.5 °F (36.9 °C) (Oral)   Resp 18   Ht 162.6 cm (64\")   Wt 110 kg (243 lb)   LMP 10/06/2017 (Approximate)   BMI 41.71 kg/m²   HEENT: normocephalic, atraumatic,oropharynx clear, appearance of ears and nose normal  Neck - supple, no significant adenopathy, no thyromegaly  Lymphatics - no palpable lymphadenopathy in the neck or groin, no hepatosplenomegaly  Chest - clear to auscultation, no wheezes, rales or rhonchi, respiratory effort non-labored  Heart - normal rate, regular rhythm, normal S1, S2, no murmurs, rubs, clicks or gallops, no JVD, no lower extremity edema  Abdomen - soft, nontender, nondistended, no masses, no hepatosplenomegaly  no rebound tenderness noted, bowel sounds normal  Vaginal Exam: 1-2/40%/-3, no blood in vault ,external genitalia " normal  Extremities - no pedal edema noted, no calf tend, normal gait  Skin -warm and dry, normal coloration and turgor, no rashes, no suspicious skin lesions noted        Fetal monitoring: indication contractions , onset 0155 , offset 0252 , baseline 135 , mod BTB variability , multiple accels (15 X 15), no decels, occas contractions, interpretation reactive NST    Radiology     Assessment 1)IUP 35 3/7 weeks    2)false labor    3)prev     4)gest DM- diet contraolled  5)obestiy    Plan 1)observe     2)home     3)keep next sched appt    Eddie Villarreal MD  2018  3:06 AM

## 2018-06-01 ENCOUNTER — ROUTINE PRENATAL (OUTPATIENT)
Dept: OBSTETRICS AND GYNECOLOGY | Facility: CLINIC | Age: 23
End: 2018-06-01

## 2018-06-01 VITALS — SYSTOLIC BLOOD PRESSURE: 118 MMHG | DIASTOLIC BLOOD PRESSURE: 62 MMHG | BODY MASS INDEX: 42.4 KG/M2 | WEIGHT: 247 LBS

## 2018-06-01 DIAGNOSIS — O34.219 PREVIOUS CESAREAN DELIVERY AFFECTING PREGNANCY: ICD-10-CM

## 2018-06-01 DIAGNOSIS — O24.410 DIET CONTROLLED GESTATIONAL DIABETES MELLITUS (GDM) IN THIRD TRIMESTER: Primary | ICD-10-CM

## 2018-06-01 DIAGNOSIS — O40.9XX0 POLYHYDRAMNIOS AFFECTING PREGNANCY: ICD-10-CM

## 2018-06-01 DIAGNOSIS — O47.03 PRETERM UTERINE CONTRACTIONS, ANTEPARTUM, THIRD TRIMESTER: ICD-10-CM

## 2018-06-01 DIAGNOSIS — E66.01 MORBID OBESITY WITH BODY MASS INDEX (BMI) OF 40.0 TO 44.9 IN ADULT (HCC): ICD-10-CM

## 2018-06-01 LAB
ACCELERATIONS > 10BPM: 6
ACCELERATIONS > 15 BPM: 4
ACOUSTIC STIMULATION: NO
DECELERATIONS: NORMAL
FHR VARIABILITIES: NORMAL
NST ASSESSMENT: REACTIVE
NST BASELINE: 148
NST DURATION: 18 MINUTES
NST INDICATIONS: NORMAL
NST LOCATIONS: NORMAL
NST READ BY: NORMAL
NST RETURN: NORMAL
UTERINE ACTIVITY: YES

## 2018-06-01 PROCEDURE — 99212-NC PR NO CHARGE CBC OFFICE OUTPATIENT VISIT 10 MINUTES: Performed by: OBSTETRICS & GYNECOLOGY

## 2018-06-01 PROCEDURE — 59025 FETAL NON-STRESS TEST: CPT | Performed by: OBSTETRICS & GYNECOLOGY

## 2018-06-01 NOTE — PROGRESS NOTES
Lab Results   Component Value Date    ABORH B Rh Positive 2016    LABANTI Negative 2016       Chief Complaint   Patient presents with   • Routine Prenatal Visit     pt states that she has been having contractions this week and is having a lot of pressure.        Today Marcela complains of abdominal pain in the lower midline  See ob flowsheet for physical exam.    Prenatal Assessment  Fetal Heart Rate: 154  Fundal Height (cm): 40 cm  Movement: Present  Presentation: Vertex  Prenatal Vitals  BP: 118/62  Weight: 112 kg (247 lb)  Urine Glucose/Protein  Urine Glucose: Negative  Urine Protein: Negative  Vaginal Drainage  Draining Fluid: No  Edema  LLE Edema: Mild pitting, slight indentation  RLE Edema: Mild pitting, slight indentation  Facial Edema: None     Tests done today: NST - reactive  At the time of the next visit, she will need to have NST - here    Impression:   Encounter Diagnoses   Name Primary?   • Diet controlled gestational diabetes mellitus (GDM) in third trimester Yes   • Polyhydramnios affecting pregnancy    • Previous  delivery affecting pregnancy    • Morbid obesity with body mass index (BMI) of 40.0 to 44.9 in adult    •  uterine contractions, antepartum, third trimester        Plan: Return 18 for NST    This note was electronically signed.    Juan Avilez MD

## 2018-06-05 ENCOUNTER — HOSPITAL ENCOUNTER (INPATIENT)
Facility: HOSPITAL | Age: 23
LOS: 4 days | Discharge: HOME | End: 2018-06-09
Attending: OBSTETRICS & GYNECOLOGY | Admitting: OBSTETRICS & GYNECOLOGY

## 2018-06-05 ENCOUNTER — ANESTHESIA EVENT (OUTPATIENT)
Dept: LABOR AND DELIVERY | Facility: HOSPITAL | Age: 23
End: 2018-06-05

## 2018-06-05 ENCOUNTER — APPOINTMENT (OUTPATIENT)
Dept: WOMENS IMAGING | Facility: HOSPITAL | Age: 23
End: 2018-06-05
Attending: OBSTETRICS & GYNECOLOGY

## 2018-06-05 ENCOUNTER — ANESTHESIA (OUTPATIENT)
Dept: LABOR AND DELIVERY | Facility: HOSPITAL | Age: 23
End: 2018-06-05

## 2018-06-05 DIAGNOSIS — O47.03 PRETERM UTERINE CONTRACTIONS IN THIRD TRIMESTER, ANTEPARTUM: Primary | ICD-10-CM

## 2018-06-05 DIAGNOSIS — O34.219 PREVIOUS CESAREAN DELIVERY AFFECTING PREGNANCY: ICD-10-CM

## 2018-06-05 DIAGNOSIS — Z3A.36 36 WEEKS GESTATION OF PREGNANCY: ICD-10-CM

## 2018-06-05 DIAGNOSIS — O24.410 DIET CONTROLLED GESTATIONAL DIABETES MELLITUS (GDM) IN THIRD TRIMESTER: ICD-10-CM

## 2018-06-05 DIAGNOSIS — Z01.818 TUBAL LIGATION EVALUATION: ICD-10-CM

## 2018-06-05 DIAGNOSIS — O40.9XX0 POLYHYDRAMNIOS AFFECTING PREGNANCY: ICD-10-CM

## 2018-06-05 PROBLEM — Z30.2 ENCOUNTER FOR STERILIZATION: Status: ACTIVE | Noted: 2018-06-05

## 2018-06-05 PROBLEM — O36.63X0 FETAL MACROSOMIA IN THIRD TRIMESTER: Status: RESOLVED | Noted: 2018-05-24 | Resolved: 2018-06-05

## 2018-06-05 LAB
ABO GROUP BLD: NORMAL
ALP SERPL-CCNC: 116 U/L (ref 25–100)
ALT SERPL W P-5'-P-CCNC: 12 U/L (ref 7–40)
AMPHET+METHAMPHET UR QL: NEGATIVE
AMPHETAMINES UR QL: NEGATIVE
AST SERPL-CCNC: 19 U/L (ref 0–33)
BACTERIA UR QL AUTO: ABNORMAL /HPF
BARBITURATES UR QL SCN: NEGATIVE
BENZODIAZ UR QL SCN: NEGATIVE
BILIRUB SERPL-MCNC: 0.4 MG/DL (ref 0.3–1.2)
BILIRUB UR QL STRIP: NEGATIVE
BLD GP AB SCN SERPL QL: NEGATIVE
BUPRENORPHINE SERPL-MCNC: NEGATIVE NG/ML
CANNABINOIDS SERPL QL: NEGATIVE
CLARITY UR: ABNORMAL
COCAINE UR QL: NEGATIVE
COLOR UR: YELLOW
CREAT BLD-MCNC: 0.6 MG/DL (ref 0.6–1.3)
DEPRECATED RDW RBC AUTO: 38.5 FL (ref 37–54)
ERYTHROCYTE [DISTWIDTH] IN BLOOD BY AUTOMATED COUNT: 13.1 % (ref 11.3–14.5)
FIBRINOGEN PPP-MCNC: 428 MG/DL (ref 198–466)
GLUCOSE BLDC GLUCOMTR-MCNC: 111 MG/DL (ref 70–130)
GLUCOSE BLDC GLUCOMTR-MCNC: 122 MG/DL (ref 70–130)
GLUCOSE UR STRIP-MCNC: NEGATIVE MG/DL
HCT VFR BLD AUTO: 34 % (ref 34.5–44)
HGB BLD-MCNC: 11.3 G/DL (ref 11.5–15.5)
HGB UR QL STRIP.AUTO: NEGATIVE
HYALINE CASTS UR QL AUTO: ABNORMAL /LPF
KETONES UR QL STRIP: NEGATIVE
LDH SERPL-CCNC: 229 U/L (ref 120–246)
LEUKOCYTE ESTERASE UR QL STRIP.AUTO: ABNORMAL
MCH RBC QN AUTO: 26.7 PG (ref 27–31)
MCHC RBC AUTO-ENTMCNC: 33.2 G/DL (ref 32–36)
MCV RBC AUTO: 80.2 FL (ref 80–99)
METHADONE UR QL SCN: NEGATIVE
NITRITE UR QL STRIP: NEGATIVE
OPIATES UR QL: NEGATIVE
OXYCODONE UR QL SCN: NEGATIVE
PCP UR QL SCN: NEGATIVE
PH UR STRIP.AUTO: 6.5 [PH] (ref 5–8)
PLATELET # BLD AUTO: 242 10*3/MM3 (ref 150–450)
PMV BLD AUTO: 10.7 FL (ref 6–12)
PROPOXYPH UR QL: NEGATIVE
PROT UR QL STRIP: NEGATIVE
RBC # BLD AUTO: 4.24 10*6/MM3 (ref 3.89–5.14)
RBC # UR: ABNORMAL /HPF
REF LAB TEST METHOD: ABNORMAL
RH BLD: POSITIVE
SP GR UR STRIP: 1.01 (ref 1–1.03)
SQUAMOUS #/AREA URNS HPF: ABNORMAL /HPF
T&S EXPIRATION DATE: NORMAL
TRICYCLICS UR QL SCN: NEGATIVE
URATE SERPL-MCNC: 3.7 MG/DL (ref 3.1–7.8)
UROBILINOGEN UR QL STRIP: ABNORMAL
WBC NRBC COR # BLD: 7.25 10*3/MM3 (ref 3.5–10.8)
WBC UR QL AUTO: ABNORMAL /HPF

## 2018-06-05 PROCEDURE — 25010000002 ONDANSETRON PER 1 MG: Performed by: NURSE ANESTHETIST, CERTIFIED REGISTERED

## 2018-06-05 PROCEDURE — 0UB70ZZ EXCISION OF BILATERAL FALLOPIAN TUBES, OPEN APPROACH: ICD-10-PCS | Performed by: OBSTETRICS & GYNECOLOGY

## 2018-06-05 PROCEDURE — 25010000002 METOCLOPRAMIDE PER 10 MG: Performed by: NURSE ANESTHETIST, CERTIFIED REGISTERED

## 2018-06-05 PROCEDURE — 82962 GLUCOSE BLOOD TEST: CPT

## 2018-06-05 PROCEDURE — 86901 BLOOD TYPING SEROLOGIC RH(D): CPT | Performed by: OBSTETRICS & GYNECOLOGY

## 2018-06-05 PROCEDURE — 51702 INSERT TEMP BLADDER CATH: CPT

## 2018-06-05 PROCEDURE — 59025 FETAL NON-STRESS TEST: CPT | Performed by: OBSTETRICS & GYNECOLOGY

## 2018-06-05 PROCEDURE — 84075 ASSAY ALKALINE PHOSPHATASE: CPT | Performed by: OBSTETRICS & GYNECOLOGY

## 2018-06-05 PROCEDURE — 83615 LACTATE (LD) (LDH) ENZYME: CPT | Performed by: OBSTETRICS & GYNECOLOGY

## 2018-06-05 PROCEDURE — 84460 ALANINE AMINO (ALT) (SGPT): CPT | Performed by: OBSTETRICS & GYNECOLOGY

## 2018-06-05 PROCEDURE — 25010000002 HYDROMORPHONE PER 4 MG: Performed by: NURSE ANESTHETIST, CERTIFIED REGISTERED

## 2018-06-05 PROCEDURE — 88302 TISSUE EXAM BY PATHOLOGIST: CPT | Performed by: OBSTETRICS & GYNECOLOGY

## 2018-06-05 PROCEDURE — 59515 CESAREAN DELIVERY: CPT | Performed by: OBSTETRICS & GYNECOLOGY

## 2018-06-05 PROCEDURE — 85384 FIBRINOGEN ACTIVITY: CPT | Performed by: OBSTETRICS & GYNECOLOGY

## 2018-06-05 PROCEDURE — 84450 TRANSFERASE (AST) (SGOT): CPT | Performed by: OBSTETRICS & GYNECOLOGY

## 2018-06-05 PROCEDURE — 25010000003 CEFAZOLIN IN DEXTROSE 2-4 GM/100ML-% SOLUTION: Performed by: OBSTETRICS & GYNECOLOGY

## 2018-06-05 PROCEDURE — 25010000002 TERBUTALINE PER 1 MG: Performed by: OBSTETRICS & GYNECOLOGY

## 2018-06-05 PROCEDURE — 81001 URINALYSIS AUTO W/SCOPE: CPT | Performed by: OBSTETRICS & GYNECOLOGY

## 2018-06-05 PROCEDURE — 85027 COMPLETE CBC AUTOMATED: CPT | Performed by: OBSTETRICS & GYNECOLOGY

## 2018-06-05 PROCEDURE — 25010000002 PHENYLEPHRINE PER 1 ML: Performed by: NURSE ANESTHETIST, CERTIFIED REGISTERED

## 2018-06-05 PROCEDURE — 80306 DRUG TEST PRSMV INSTRMNT: CPT | Performed by: OBSTETRICS & GYNECOLOGY

## 2018-06-05 PROCEDURE — 86850 RBC ANTIBODY SCREEN: CPT | Performed by: OBSTETRICS & GYNECOLOGY

## 2018-06-05 PROCEDURE — 25010000002 FENTANYL CITRATE (PF) 100 MCG/2ML SOLUTION: Performed by: NURSE ANESTHETIST, CERTIFIED REGISTERED

## 2018-06-05 PROCEDURE — 86900 BLOOD TYPING SEROLOGIC ABO: CPT | Performed by: OBSTETRICS & GYNECOLOGY

## 2018-06-05 PROCEDURE — 87086 URINE CULTURE/COLONY COUNT: CPT | Performed by: OBSTETRICS & GYNECOLOGY

## 2018-06-05 PROCEDURE — 82247 BILIRUBIN TOTAL: CPT | Performed by: OBSTETRICS & GYNECOLOGY

## 2018-06-05 PROCEDURE — 84550 ASSAY OF BLOOD/URIC ACID: CPT | Performed by: OBSTETRICS & GYNECOLOGY

## 2018-06-05 PROCEDURE — 25010000003 MORPHINE PER 10 MG: Performed by: NURSE ANESTHETIST, CERTIFIED REGISTERED

## 2018-06-05 PROCEDURE — 59025 FETAL NON-STRESS TEST: CPT

## 2018-06-05 PROCEDURE — 25010000002 MIDAZOLAM PER 1 MG: Performed by: NURSE ANESTHETIST, CERTIFIED REGISTERED

## 2018-06-05 PROCEDURE — 82565 ASSAY OF CREATININE: CPT | Performed by: OBSTETRICS & GYNECOLOGY

## 2018-06-05 RX ORDER — ONDANSETRON 2 MG/ML
INJECTION INTRAMUSCULAR; INTRAVENOUS AS NEEDED
Status: DISCONTINUED | OUTPATIENT
Start: 2018-06-05 | End: 2018-06-05 | Stop reason: SURG

## 2018-06-05 RX ORDER — PRENATAL VIT/IRON FUM/FOLIC AC 27MG-0.8MG
1 TABLET ORAL DAILY
Status: DISCONTINUED | OUTPATIENT
Start: 2018-06-05 | End: 2018-06-09 | Stop reason: HOSPADM

## 2018-06-05 RX ORDER — ONDANSETRON 2 MG/ML
4 INJECTION INTRAMUSCULAR; INTRAVENOUS ONCE
Status: COMPLETED | OUTPATIENT
Start: 2018-06-05 | End: 2018-06-05

## 2018-06-05 RX ORDER — METHYLERGONOVINE MALEATE 0.2 MG/ML
200 INJECTION INTRAVENOUS AS NEEDED
Status: DISCONTINUED | OUTPATIENT
Start: 2018-06-05 | End: 2018-06-05 | Stop reason: HOSPADM

## 2018-06-05 RX ORDER — ACETAMINOPHEN 325 MG/1
650 TABLET ORAL ONCE AS NEEDED
Status: DISCONTINUED | OUTPATIENT
Start: 2018-06-05 | End: 2018-06-05 | Stop reason: HOSPADM

## 2018-06-05 RX ORDER — LIDOCAINE HYDROCHLORIDE 10 MG/ML
5 INJECTION, SOLUTION EPIDURAL; INFILTRATION; INTRACAUDAL; PERINEURAL AS NEEDED
Status: DISCONTINUED | OUTPATIENT
Start: 2018-06-05 | End: 2018-06-05 | Stop reason: HOSPADM

## 2018-06-05 RX ORDER — BUPIVACAINE HYDROCHLORIDE 7.5 MG/ML
INJECTION, SOLUTION EPIDURAL; RETROBULBAR AS NEEDED
Status: DISCONTINUED | OUTPATIENT
Start: 2018-06-05 | End: 2018-06-05 | Stop reason: SURG

## 2018-06-05 RX ORDER — PROMETHAZINE HYDROCHLORIDE 25 MG/ML
12.5 INJECTION, SOLUTION INTRAMUSCULAR; INTRAVENOUS EVERY 6 HOURS PRN
Status: DISCONTINUED | OUTPATIENT
Start: 2018-06-05 | End: 2018-06-05 | Stop reason: HOSPADM

## 2018-06-05 RX ORDER — ACETAMINOPHEN 325 MG/1
650 TABLET ORAL EVERY 4 HOURS PRN
Status: DISCONTINUED | OUTPATIENT
Start: 2018-06-05 | End: 2018-06-05 | Stop reason: HOSPADM

## 2018-06-05 RX ORDER — SODIUM CHLORIDE, SODIUM LACTATE, POTASSIUM CHLORIDE, CALCIUM CHLORIDE 600; 310; 30; 20 MG/100ML; MG/100ML; MG/100ML; MG/100ML
125 INJECTION, SOLUTION INTRAVENOUS CONTINUOUS
Status: DISCONTINUED | OUTPATIENT
Start: 2018-06-05 | End: 2018-06-09 | Stop reason: HOSPADM

## 2018-06-05 RX ORDER — IBUPROFEN 600 MG/1
600 TABLET ORAL EVERY 6 HOURS PRN
Status: DISCONTINUED | OUTPATIENT
Start: 2018-06-05 | End: 2018-06-09 | Stop reason: HOSPADM

## 2018-06-05 RX ORDER — OXYTOCIN-SODIUM CHLORIDE 0.9% IV SOLN 30 UNIT/500ML 30-0.9/5 UT/ML-%
650 SOLUTION INTRAVENOUS ONCE
Status: DISCONTINUED | OUTPATIENT
Start: 2018-06-05 | End: 2018-06-05 | Stop reason: HOSPADM

## 2018-06-05 RX ORDER — METOCLOPRAMIDE HYDROCHLORIDE 5 MG/ML
INJECTION INTRAMUSCULAR; INTRAVENOUS AS NEEDED
Status: DISCONTINUED | OUTPATIENT
Start: 2018-06-05 | End: 2018-06-05 | Stop reason: SURG

## 2018-06-05 RX ORDER — SODIUM CHLORIDE 0.9 % (FLUSH) 0.9 %
1-10 SYRINGE (ML) INJECTION AS NEEDED
Status: DISCONTINUED | OUTPATIENT
Start: 2018-06-05 | End: 2018-06-05 | Stop reason: HOSPADM

## 2018-06-05 RX ORDER — PROMETHAZINE HYDROCHLORIDE 25 MG/ML
12.5 INJECTION, SOLUTION INTRAMUSCULAR; INTRAVENOUS EVERY 6 HOURS PRN
Status: DISCONTINUED | OUTPATIENT
Start: 2018-06-05 | End: 2018-06-09 | Stop reason: HOSPADM

## 2018-06-05 RX ORDER — FENTANYL CITRATE 50 UG/ML
INJECTION, SOLUTION INTRAMUSCULAR; INTRAVENOUS AS NEEDED
Status: DISCONTINUED | OUTPATIENT
Start: 2018-06-05 | End: 2018-06-05 | Stop reason: SURG

## 2018-06-05 RX ORDER — CEFAZOLIN SODIUM 2 G/100ML
2 INJECTION, SOLUTION INTRAVENOUS ONCE
Status: COMPLETED | OUTPATIENT
Start: 2018-06-05 | End: 2018-06-05

## 2018-06-05 RX ORDER — PROMETHAZINE HYDROCHLORIDE 12.5 MG/1
12.5 SUPPOSITORY RECTAL EVERY 6 HOURS PRN
Status: DISCONTINUED | OUTPATIENT
Start: 2018-06-05 | End: 2018-06-05 | Stop reason: HOSPADM

## 2018-06-05 RX ORDER — DOCUSATE SODIUM 100 MG/1
100 CAPSULE, LIQUID FILLED ORAL 2 TIMES DAILY PRN
Status: DISCONTINUED | OUTPATIENT
Start: 2018-06-05 | End: 2018-06-09 | Stop reason: HOSPADM

## 2018-06-05 RX ORDER — OXYCODONE AND ACETAMINOPHEN 7.5; 325 MG/1; MG/1
1 TABLET ORAL ONCE AS NEEDED
Status: COMPLETED | OUTPATIENT
Start: 2018-06-05 | End: 2018-06-05

## 2018-06-05 RX ORDER — PROMETHAZINE HYDROCHLORIDE 12.5 MG/1
12.5 SUPPOSITORY RECTAL EVERY 6 HOURS PRN
Status: DISCONTINUED | OUTPATIENT
Start: 2018-06-05 | End: 2018-06-09 | Stop reason: HOSPADM

## 2018-06-05 RX ORDER — ZOLPIDEM TARTRATE 5 MG/1
10 TABLET ORAL NIGHTLY PRN
Status: DISCONTINUED | OUTPATIENT
Start: 2018-06-05 | End: 2018-06-09 | Stop reason: HOSPADM

## 2018-06-05 RX ORDER — HYDROMORPHONE HYDROCHLORIDE 1 MG/ML
0.5 INJECTION, SOLUTION INTRAMUSCULAR; INTRAVENOUS; SUBCUTANEOUS
Status: DISCONTINUED | OUTPATIENT
Start: 2018-06-05 | End: 2018-06-05 | Stop reason: HOSPADM

## 2018-06-05 RX ORDER — OXYCODONE HYDROCHLORIDE AND ACETAMINOPHEN 5; 325 MG/1; MG/1
1 TABLET ORAL EVERY 4 HOURS PRN
Status: DISCONTINUED | OUTPATIENT
Start: 2018-06-05 | End: 2018-06-09 | Stop reason: HOSPADM

## 2018-06-05 RX ORDER — METOCLOPRAMIDE HYDROCHLORIDE 5 MG/ML
10 INJECTION INTRAMUSCULAR; INTRAVENOUS ONCE AS NEEDED
Status: DISCONTINUED | OUTPATIENT
Start: 2018-06-05 | End: 2018-06-05 | Stop reason: HOSPADM

## 2018-06-05 RX ORDER — TERBUTALINE SULFATE 1 MG/ML
0.25 INJECTION, SOLUTION SUBCUTANEOUS ONCE
Status: COMPLETED | OUTPATIENT
Start: 2018-06-05 | End: 2018-06-05

## 2018-06-05 RX ORDER — OXYTOCIN-SODIUM CHLORIDE 0.9% IV SOLN 30 UNIT/500ML 30-0.9/5 UT/ML-%
85 SOLUTION INTRAVENOUS ONCE
Status: DISCONTINUED | OUTPATIENT
Start: 2018-06-05 | End: 2018-06-05 | Stop reason: HOSPADM

## 2018-06-05 RX ORDER — CARBOPROST TROMETHAMINE 250 UG/ML
250 INJECTION, SOLUTION INTRAMUSCULAR AS NEEDED
Status: DISCONTINUED | OUTPATIENT
Start: 2018-06-05 | End: 2018-06-05 | Stop reason: HOSPADM

## 2018-06-05 RX ORDER — PROMETHAZINE HYDROCHLORIDE 12.5 MG/1
12.5 TABLET ORAL EVERY 6 HOURS PRN
Status: DISCONTINUED | OUTPATIENT
Start: 2018-06-05 | End: 2018-06-05 | Stop reason: HOSPADM

## 2018-06-05 RX ORDER — MISOPROSTOL 200 UG/1
800 TABLET ORAL AS NEEDED
Status: DISCONTINUED | OUTPATIENT
Start: 2018-06-05 | End: 2018-06-05 | Stop reason: HOSPADM

## 2018-06-05 RX ORDER — IBUPROFEN 600 MG/1
600 TABLET ORAL EVERY 6 HOURS PRN
Status: DISCONTINUED | OUTPATIENT
Start: 2018-06-05 | End: 2018-06-05 | Stop reason: HOSPADM

## 2018-06-05 RX ORDER — CEFAZOLIN SODIUM 2 G/100ML
2 INJECTION, SOLUTION INTRAVENOUS EVERY 8 HOURS
Status: COMPLETED | OUTPATIENT
Start: 2018-06-05 | End: 2018-06-06

## 2018-06-05 RX ORDER — SIMETHICONE 80 MG
80 TABLET,CHEWABLE ORAL
Status: DISCONTINUED | OUTPATIENT
Start: 2018-06-05 | End: 2018-06-09 | Stop reason: HOSPADM

## 2018-06-05 RX ORDER — IBUPROFEN 600 MG/1
600 TABLET ORAL ONCE AS NEEDED
Status: COMPLETED | OUTPATIENT
Start: 2018-06-05 | End: 2018-06-05

## 2018-06-05 RX ORDER — OXYCODONE HYDROCHLORIDE AND ACETAMINOPHEN 5; 325 MG/1; MG/1
1 TABLET ORAL EVERY 4 HOURS PRN
Status: DISCONTINUED | OUTPATIENT
Start: 2018-06-05 | End: 2018-06-05 | Stop reason: HOSPADM

## 2018-06-05 RX ORDER — PROMETHAZINE HYDROCHLORIDE 25 MG/1
25 TABLET ORAL EVERY 6 HOURS PRN
Status: DISCONTINUED | OUTPATIENT
Start: 2018-06-05 | End: 2018-06-09 | Stop reason: HOSPADM

## 2018-06-05 RX ORDER — SODIUM CHLORIDE, SODIUM LACTATE, POTASSIUM CHLORIDE, CALCIUM CHLORIDE 600; 310; 30; 20 MG/100ML; MG/100ML; MG/100ML; MG/100ML
125 INJECTION, SOLUTION INTRAVENOUS CONTINUOUS
Status: DISCONTINUED | OUTPATIENT
Start: 2018-06-05 | End: 2018-06-09

## 2018-06-05 RX ORDER — SODIUM CHLORIDE 0.9 % (FLUSH) 0.9 %
1-10 SYRINGE (ML) INJECTION AS NEEDED
Status: DISCONTINUED | OUTPATIENT
Start: 2018-06-05 | End: 2018-06-09 | Stop reason: HOSPADM

## 2018-06-05 RX ORDER — OXYTOCIN 10 [USP'U]/ML
INJECTION, SOLUTION INTRAMUSCULAR; INTRAVENOUS AS NEEDED
Status: DISCONTINUED | OUTPATIENT
Start: 2018-06-05 | End: 2018-06-05 | Stop reason: SURG

## 2018-06-05 RX ORDER — MIDAZOLAM HYDROCHLORIDE 1 MG/ML
INJECTION INTRAMUSCULAR; INTRAVENOUS AS NEEDED
Status: DISCONTINUED | OUTPATIENT
Start: 2018-06-05 | End: 2018-06-05 | Stop reason: SURG

## 2018-06-05 RX ORDER — MORPHINE SULFATE 0.5 MG/ML
INJECTION, SOLUTION EPIDURAL; INTRATHECAL; INTRAVENOUS AS NEEDED
Status: DISCONTINUED | OUTPATIENT
Start: 2018-06-05 | End: 2018-06-05 | Stop reason: SURG

## 2018-06-05 RX ORDER — TRISODIUM CITRATE DIHYDRATE AND CITRIC ACID MONOHYDRATE 500; 334 MG/5ML; MG/5ML
30 SOLUTION ORAL ONCE
Status: COMPLETED | OUTPATIENT
Start: 2018-06-05 | End: 2018-06-05

## 2018-06-05 RX ORDER — HYDROCODONE BITARTRATE AND ACETAMINOPHEN 5; 325 MG/1; MG/1
1 TABLET ORAL EVERY 4 HOURS PRN
Status: DISCONTINUED | OUTPATIENT
Start: 2018-06-05 | End: 2018-06-09 | Stop reason: HOSPADM

## 2018-06-05 RX ORDER — LANOLIN 100 %
OINTMENT (GRAM) TOPICAL
Status: DISCONTINUED | OUTPATIENT
Start: 2018-06-05 | End: 2018-06-09 | Stop reason: HOSPADM

## 2018-06-05 RX ORDER — MORPHINE SULFATE 2 MG/ML
2 INJECTION, SOLUTION INTRAMUSCULAR; INTRAVENOUS
Status: DISCONTINUED | OUTPATIENT
Start: 2018-06-05 | End: 2018-06-05 | Stop reason: HOSPADM

## 2018-06-05 RX ADMIN — IBUPROFEN 600 MG: 600 TABLET ORAL at 10:55

## 2018-06-05 RX ADMIN — IBUPROFEN 600 MG: 600 TABLET ORAL at 17:56

## 2018-06-05 RX ADMIN — TERBUTALINE SULFATE 0.25 MG: 1 INJECTION, SOLUTION SUBCUTANEOUS at 06:40

## 2018-06-05 RX ADMIN — OXYTOCIN 20 UNITS: 10 INJECTION, SOLUTION INTRAMUSCULAR; INTRAVENOUS at 10:15

## 2018-06-05 RX ADMIN — CEFAZOLIN SODIUM 2 G: 2 INJECTION, SOLUTION INTRAVENOUS at 08:58

## 2018-06-05 RX ADMIN — SIMETHICONE CHEW TAB 80 MG 80 MG: 80 TABLET ORAL at 21:37

## 2018-06-05 RX ADMIN — HYDROMORPHONE HYDROCHLORIDE 0.5 MG: 1 INJECTION, SOLUTION INTRAMUSCULAR; INTRAVENOUS; SUBCUTANEOUS at 10:55

## 2018-06-05 RX ADMIN — MORPHINE SULFATE 150 MCG: 0.5 INJECTION, SOLUTION EPIDURAL; INTRATHECAL; INTRAVENOUS at 09:16

## 2018-06-05 RX ADMIN — METOCLOPRAMIDE 10 MG: 5 INJECTION, SOLUTION INTRAMUSCULAR; INTRAVENOUS at 09:10

## 2018-06-05 RX ADMIN — OXYTOCIN 20 UNITS: 10 INJECTION, SOLUTION INTRAMUSCULAR; INTRAVENOUS at 09:41

## 2018-06-05 RX ADMIN — OXYCODONE HYDROCHLORIDE AND ACETAMINOPHEN 1 TABLET: 5; 325 TABLET ORAL at 17:51

## 2018-06-05 RX ADMIN — BUPIVACAINE HYDROCHLORIDE 1.4 ML: 7.5 INJECTION, SOLUTION EPIDURAL; RETROBULBAR at 09:16

## 2018-06-05 RX ADMIN — MIDAZOLAM HYDROCHLORIDE 1 MG: 1 INJECTION, SOLUTION INTRAMUSCULAR; INTRAVENOUS at 09:57

## 2018-06-05 RX ADMIN — PHENYLEPHRINE HYDROCHLORIDE 100 MCG: 10 INJECTION INTRAVENOUS at 09:19

## 2018-06-05 RX ADMIN — OXYCODONE HYDROCHLORIDE AND ACETAMINOPHEN 1 TABLET: 7.5; 325 TABLET ORAL at 10:55

## 2018-06-05 RX ADMIN — SODIUM CHLORIDE, POTASSIUM CHLORIDE, SODIUM LACTATE AND CALCIUM CHLORIDE 1000 ML: 600; 310; 30; 20 INJECTION, SOLUTION INTRAVENOUS at 08:33

## 2018-06-05 RX ADMIN — ONDANSETRON 4 MG: 2 INJECTION INTRAMUSCULAR; INTRAVENOUS at 09:08

## 2018-06-05 RX ADMIN — SODIUM CHLORIDE, POTASSIUM CHLORIDE, SODIUM LACTATE AND CALCIUM CHLORIDE 1000 ML: 600; 310; 30; 20 INJECTION, SOLUTION INTRAVENOUS at 06:39

## 2018-06-05 RX ADMIN — MIDAZOLAM HYDROCHLORIDE 1 MG: 1 INJECTION, SOLUTION INTRAMUSCULAR; INTRAVENOUS at 09:08

## 2018-06-05 RX ADMIN — SODIUM CHLORIDE, POTASSIUM CHLORIDE, SODIUM LACTATE AND CALCIUM CHLORIDE: 600; 310; 30; 20 INJECTION, SOLUTION INTRAVENOUS at 09:41

## 2018-06-05 RX ADMIN — OXYCODONE HYDROCHLORIDE AND ACETAMINOPHEN 1 TABLET: 5; 325 TABLET ORAL at 22:11

## 2018-06-05 RX ADMIN — FENTANYL CITRATE 15 MCG: 50 INJECTION, SOLUTION INTRAMUSCULAR; INTRAVENOUS at 09:16

## 2018-06-05 RX ADMIN — SODIUM CHLORIDE, POTASSIUM CHLORIDE, SODIUM LACTATE AND CALCIUM CHLORIDE 125 ML/HR: 600; 310; 30; 20 INJECTION, SOLUTION INTRAVENOUS at 20:17

## 2018-06-05 RX ADMIN — ONDANSETRON 4 MG: 2 INJECTION INTRAMUSCULAR; INTRAVENOUS at 10:55

## 2018-06-05 RX ADMIN — SODIUM CHLORIDE, POTASSIUM CHLORIDE, SODIUM LACTATE AND CALCIUM CHLORIDE: 600; 310; 30; 20 INJECTION, SOLUTION INTRAVENOUS at 09:05

## 2018-06-05 RX ADMIN — SODIUM CHLORIDE, POTASSIUM CHLORIDE, SODIUM LACTATE AND CALCIUM CHLORIDE 999 ML/HR: 600; 310; 30; 20 INJECTION, SOLUTION INTRAVENOUS at 08:43

## 2018-06-05 RX ADMIN — SODIUM CITRATE AND CITRIC ACID MONOHYDRATE 30 ML: 500; 334 SOLUTION ORAL at 08:58

## 2018-06-05 RX ADMIN — CEFAZOLIN SODIUM 2 G: 2 INJECTION, SOLUTION INTRAVENOUS at 17:00

## 2018-06-05 RX ADMIN — PHENYLEPHRINE HYDROCHLORIDE 100 MCG: 10 INJECTION INTRAVENOUS at 09:49

## 2018-06-05 NOTE — ANESTHESIA POSTPROCEDURE EVALUATION
Patient: Marcela Perales    Procedure Summary     Date:  18 Room / Location:  [facility] LABOR DELIVERY   [facility]    Anesthesia Start:  905 Anesthesia Stop:      Procedure:   SECTION REPEAT WITH TUBAL (N/A Abdomen) Diagnosis:      Surgeon:  Juan Avilez MD Provider:  Gela Newsome DO    Anesthesia Type:  spinal, ITN ASA Status:  3 - Emergent          Anesthesia Type: spinal, ITN  Last vitals  BP 94/47   Temp 97.6   Pulse 93   Resp 17   SpO2 96     Post Anesthesia Care and Evaluation    Patient location during evaluation: bedside  Patient participation: complete - patient participated  Level of consciousness: awake and alert  Pain management: adequate  Airway patency: patent  Anesthetic complications: No anesthetic complications    Cardiovascular status: acceptable  Respiratory status: acceptable  Hydration status: acceptable

## 2018-06-05 NOTE — H&P
"[facility]  1995  [id number]  [id number]    CC: contractions  HPI:  Patient is 23 y.o. black female   currently at 36w4d  Presents with c/o pelvic pressure and uterine contractions.  Onset ~0400, rates 7/10, radiates to back, denies assoc vag bleeding or SROM.  PNC comp by prev , gest diabetes, hydramnios, and obesity.  Good FM    PMH:  Current meds PNV  Illnesses none  Surgeries , oral surg  Allergies NKDA    Past OB History:       Obstetric History       T1      L1     SAB0   TAB0   Ectopic0   Molar0   Multiple0   Live Births1       # Outcome Date GA Lbr Michele/2nd Weight Sex Delivery Anes PTL Lv   2 Current            1 Term 16 38w6d  3718 g (8 lb 3.2 oz) M CS-LTranv EPI N AYO      Name: JOSAFAT HACKETT      Complications: Failure to Progress in First Stage      Apgar1:  8                Apgar5: 9               SH: tob neg , EtOH neg, drugs neg  FH: heart dz neg , diabetes pos , cancer neg    General ROS: edema.   All other systems reviewed and are negative.      Physical Examination: General appearance - alert, well appearing, and in no distress  Vital signs - Temp 98.3 °F (36.8 °C) (Oral)   Resp 20   Ht 162.6 cm (64\")   Wt 112 kg (247 lb)   LMP 10/06/2017 (Approximate)   BMI 42.40 kg/m²   HEENT: normocephalic, atraumatic,oropharynx clear, appearance of ears and nose normal  Neck - supple, no significant adenopathy, no thyromegaly  Lymphatics - no palpable lymphadenopathy in the neck or groin, no hepatosplenomegaly  Chest - clear to auscultation, no wheezes, rales or rhonchi, respiratory effort non-labored  Heart - normal rate, regular rhythm, normal S1, S2, no murmurs, rubs, clicks or gallops, no JVD, trace lower extremity edema  Abdomen - soft, nontender, nondistended, no masses, no hepatosplenomegaly  no rebound tenderness noted, bowel sounds normal  Vaginal Exam: 1/40%/ballot, no blood in vault ,external genitalia normal  Extremities - trace pedal edema " "noted, no calf tend  Skin -warm and dry, normal coloration and turgor, no rashes, no suspicious skin lesions noted        Fetal monitoring: indication contractions , onset 0538 , offset 0612 , baseline 145 , mod BTB variability , multiple accels (15 X 15), no decels, q1-2\" contractions, interpretation reactive NST    Radiology     Assessment 1)IUP 36 4/7 weeks    2) contractions- q1-2\", cervix not dilated,  3)gest DM-diet controlled   4)prev      5)obesity     6)hydramnios    Plan 1)observe     2)IVF     3)terb     4)labs (including fibrinogen)    Eddie Villarreal MD  2018  6:09 AM                                                                    "

## 2018-06-05 NOTE — ANESTHESIA PROCEDURE NOTES
Spinal Block    Patient location during procedure: OR  Indication:at surgeon's request  Performed By  Anesthesiologist: MAURICIO KELLEY  CRNA: RENEE WHITE  Preanesthetic Checklist  Completed: patient identified, surgical consent, pre-op evaluation, timeout performed, IV checked, risks and benefits discussed and monitors and equipment checked  Spinal Block Prep:  Patient Position:sitting  Sterile Tech:cap, gloves, mask and sterile barriers  Prep:DuraPrep  Patient Monitoring:blood pressure monitoring, continuous pulse oximetry and EKG  Spinal Block Procedure  Approach:midline  Guidance:palpation technique  Location:L3-L4  Needle Type:Pencan  Needle Gauge:25 G  Placement of Spinal needle event:cerebrospinal fluid aspirated  Paresthesia: no  Fluid Appearance:clear  Post Assessment  Patient Tolerance:patient tolerated the procedure well with no apparent complications  Complications no

## 2018-06-05 NOTE — ANESTHESIA PREPROCEDURE EVALUATION
Anesthesia Evaluation     Patient summary reviewed and Nursing notes reviewed   NPO Solid Status: > 8 hours  NPO Liquid Status: > 8 hours           Airway   Mallampati: I  TM distance: >3 FB  Neck ROM: full  Dental      Pulmonary - negative pulmonary ROS   Cardiovascular - negative cardio ROS        Neuro/Psych- negative ROS  GI/Hepatic/Renal/Endo    (+) morbid obesity, GERD,  diabetes mellitus gestational,     Musculoskeletal (-) negative ROS    Abdominal    Substance History - negative use     OB/GYN    (+) Pregnant,         Other - negative ROS                     Anesthesia Plan    ASA 3 - emergent     spinal and ITN     Anesthetic plan and risks discussed with patient.

## 2018-06-05 NOTE — PROGRESS NOTES
The patient is scout every 1-2 minutes.  She is very uncomfortable.  Patient's prenatal course is complicated by gestational diabetes with polyhydramnios.  She had her first baby by primary  section and will undergo repeat  section for this delivery.  She is [employer] refusing blood products.  Her hematocrit is 34%.    Juan Avilez MD

## 2018-06-05 NOTE — L&D DELIVERY NOTE
[facility]   Section Operative Note    Pre-Operative Dx:   1. IUP at 36w4d    2. Previous  section   3. Gestational diabetes diet controlled   4. Early labor   5. Encounter for sterilization    Prior C/S;Polyhydramnios    Postoperative dx:    1.  Same     Procedure: Procedure(s):   SECTION REPEAT WITH TUBAL   Surgeon: Juan Avilez     Assistant:    Anesthesia: Spinal    EBL:    mls.  800 mL mls.         IV Fluids: 2100 mls.   UOP: 200 mls.    I/O this shift:  In:  [I.V.:2100]  Out:  [Urine:1250; Blood:800]   Antibiotics: cefazolin (Ancef)     Infant:      Name:            Gender: female  infant    Weight: 4510 g (9 lb 15.1 oz)     Apgars: 4   @ 1 minute /     7   @ 5 minutes    Cord gases: Venous:  No results found for: PHCVEN     Arterial:  No results found for: PHCART     Indication for C/Section:  Prior C/S;Polyhydramnios          Priority for C/Section:  Emergency      Procedure Details:   Patient experienced a repeat  section and bilateral tubal ligation under spinal anesthesia with delivery of a viable fetus through a low transverse uterine incision.  800 mL blood loss, no complications.      Complications:   None      Disposition:   Mother to Mother Baby/Postpartum  in stable condition currently.   Baby to NICU  in stable condition currently.       Juan Avilez MD  2018  1:10 PM

## 2018-06-05 NOTE — LACTATION NOTE
06/05/18 1525   Maternal Information   Date of Referral 06/05/18   Person Making Referral other (see comments)  (courtesy)   Infant Reason for Referral NICU admission;35-37 weeks gestation   Maternal Assessment   Breast Size Issue none   Breast Shape Bilateral:;round   Breast Density Bilateral:;soft   Nipples Bilateral:;graspable   Maternal Infant Feeding   Maternal Emotional State anxious   Equipment Type   Breast Pump Type double electric, hospital grade;other (see comments)  (Rx given)   Breast Pump Flange Type hard   Breast Pump Flange Size 24 mm   Reproductive Interventions   Breast Care: Breastfeeding frequency of feeding adjusted   Breastfeeding Assistance electric breast pump used;support offered   Breastfeeding Support diary/feeding log utilized;encouragement provided;lactation counseling provided   Breast Pumping   Breast Pumping Interventions frequent pumping encouraged   Breast Pumping double electric breast pump utilized

## 2018-06-06 LAB
BASOPHILS # BLD AUTO: 0.01 10*3/MM3 (ref 0–0.2)
BASOPHILS NFR BLD AUTO: 0.1 % (ref 0–1)
DEPRECATED RDW RBC AUTO: 38.4 FL (ref 37–54)
EOSINOPHIL # BLD AUTO: 0.05 10*3/MM3 (ref 0–0.3)
EOSINOPHIL NFR BLD AUTO: 0.6 % (ref 0–3)
ERYTHROCYTE [DISTWIDTH] IN BLOOD BY AUTOMATED COUNT: 13.2 % (ref 11.3–14.5)
GLUCOSE BLDC GLUCOMTR-MCNC: 131 MG/DL (ref 70–130)
GLUCOSE BLDC GLUCOMTR-MCNC: 135 MG/DL (ref 70–130)
GLUCOSE BLDC GLUCOMTR-MCNC: 153 MG/DL (ref 70–130)
HCT VFR BLD AUTO: 33.2 % (ref 34.5–44)
HGB BLD-MCNC: 10.8 G/DL (ref 11.5–15.5)
IMM GRANULOCYTES # BLD: 0.07 10*3/MM3 (ref 0–0.03)
IMM GRANULOCYTES NFR BLD: 0.9 % (ref 0–0.6)
LYMPHOCYTES # BLD AUTO: 1.62 10*3/MM3 (ref 0.6–4.8)
LYMPHOCYTES NFR BLD AUTO: 20 % (ref 24–44)
MCH RBC QN AUTO: 26.1 PG (ref 27–31)
MCHC RBC AUTO-ENTMCNC: 32.5 G/DL (ref 32–36)
MCV RBC AUTO: 80.2 FL (ref 80–99)
MONOCYTES # BLD AUTO: 0.72 10*3/MM3 (ref 0–1)
MONOCYTES NFR BLD AUTO: 8.9 % (ref 0–12)
NEUTROPHILS # BLD AUTO: 5.71 10*3/MM3 (ref 1.5–8.3)
NEUTROPHILS NFR BLD AUTO: 70.4 % (ref 41–71)
PLATELET # BLD AUTO: 219 10*3/MM3 (ref 150–450)
PMV BLD AUTO: 11.2 FL (ref 6–12)
RBC # BLD AUTO: 4.14 10*6/MM3 (ref 3.89–5.14)
WBC NRBC COR # BLD: 8.11 10*3/MM3 (ref 3.5–10.8)

## 2018-06-06 PROCEDURE — 82962 GLUCOSE BLOOD TEST: CPT

## 2018-06-06 PROCEDURE — 99024 POSTOP FOLLOW-UP VISIT: CPT | Performed by: OBSTETRICS & GYNECOLOGY

## 2018-06-06 PROCEDURE — 25010000003 CEFAZOLIN IN DEXTROSE 2-4 GM/100ML-% SOLUTION: Performed by: OBSTETRICS & GYNECOLOGY

## 2018-06-06 PROCEDURE — 85025 COMPLETE CBC W/AUTO DIFF WBC: CPT | Performed by: OBSTETRICS & GYNECOLOGY

## 2018-06-06 RX ADMIN — CEFAZOLIN SODIUM 2 G: 2 INJECTION, SOLUTION INTRAVENOUS at 01:22

## 2018-06-06 RX ADMIN — HYDROCODONE BITARTRATE AND ACETAMINOPHEN 1 TABLET: 5; 325 TABLET ORAL at 19:57

## 2018-06-06 RX ADMIN — SIMETHICONE CHEW TAB 80 MG 80 MG: 80 TABLET ORAL at 08:35

## 2018-06-06 RX ADMIN — OXYCODONE HYDROCHLORIDE AND ACETAMINOPHEN 1 TABLET: 5; 325 TABLET ORAL at 03:48

## 2018-06-06 RX ADMIN — IBUPROFEN 600 MG: 600 TABLET ORAL at 13:22

## 2018-06-06 RX ADMIN — SIMETHICONE CHEW TAB 80 MG 80 MG: 80 TABLET ORAL at 13:22

## 2018-06-06 RX ADMIN — OXYCODONE HYDROCHLORIDE AND ACETAMINOPHEN 1 TABLET: 5; 325 TABLET ORAL at 13:22

## 2018-06-06 RX ADMIN — SIMETHICONE CHEW TAB 80 MG 80 MG: 80 TABLET ORAL at 19:55

## 2018-06-06 RX ADMIN — SODIUM CHLORIDE, POTASSIUM CHLORIDE, SODIUM LACTATE AND CALCIUM CHLORIDE 125 ML/HR: 600; 310; 30; 20 INJECTION, SOLUTION INTRAVENOUS at 04:49

## 2018-06-06 RX ADMIN — IBUPROFEN 600 MG: 600 TABLET ORAL at 03:48

## 2018-06-06 RX ADMIN — IBUPROFEN 600 MG: 600 TABLET ORAL at 08:35

## 2018-06-06 RX ADMIN — PRENATAL VIT W/ FE FUMARATE-FA TAB 27-0.8 MG 1 TABLET: 27-0.8 TAB at 08:35

## 2018-06-06 RX ADMIN — IBUPROFEN 600 MG: 600 TABLET ORAL at 19:57

## 2018-06-06 RX ADMIN — OXYCODONE HYDROCHLORIDE AND ACETAMINOPHEN 1 TABLET: 5; 325 TABLET ORAL at 08:35

## 2018-06-06 NOTE — PLAN OF CARE
Problem: Patient Care Overview  Goal: Plan of Care Review  Outcome: Ongoing (interventions implemented as appropriate)   18 1019   Coping/Psychosocial   Plan of Care Reviewed With patient   Plan of Care Review   Progress improving       Problem: Postpartum ( Delivery) (Adult,Obstetrics,Pediatric)  Goal: Signs and Symptoms of Listed Potential Problems Will be Absent, Minimized or Managed (Postpartum)  Outcome: Ongoing (interventions implemented as appropriate)   18 1019   Goal/Outcome Evaluation   Problems Assessed (Postpartum ) all   Problems Present (Postpartum ) none       Problem: Breastfeeding (Adult,Obstetrics,Pediatric)  Goal: Signs and Symptoms of Listed Potential Problems Will be Absent, Minimized or Managed (Breastfeeding)  Outcome: Ongoing (interventions implemented as appropriate)   18 1019   Goal/Outcome Evaluation   Problems Assessed (Breastfeeding) all   Problems Present (Breastfeeding) none

## 2018-06-06 NOTE — PLAN OF CARE
Problem: Patient Care Overview  Goal: Plan of Care Review  Outcome: Ongoing (interventions implemented as appropriate)   18 0228   Coping/Psychosocial   Plan of Care Reviewed With patient   Plan of Care Review   Progress improving   OTHER   Outcome Summary doing well, up in room, tolerated well,        Problem: Postpartum ( Delivery) (Adult,Obstetrics,Pediatric)  Goal: Signs and Symptoms of Listed Potential Problems Will be Absent, Minimized or Managed (Postpartum)  Outcome: Ongoing (interventions implemented as appropriate)      Problem: Breastfeeding (Adult,Obstetrics,Pediatric)  Goal: Signs and Symptoms of Listed Potential Problems Will be Absent, Minimized or Managed (Breastfeeding)  Outcome: Ongoing (interventions implemented as appropriate)

## 2018-06-06 NOTE — LACTATION NOTE
Patient indicates she is still pumping breasts every 3 hours for NICU infant but not getting much volume yet.  She was encouraged to continue her pumping routine.  She was also encouraged to order pump for home.  She said she does plan to continuing pumping after she is discharged.

## 2018-06-06 NOTE — ANESTHESIA POSTPROCEDURE EVALUATION
Patient: Marcela Perales    Procedure Summary     Date:  18 Room / Location:  [facility] LABOR DELIVERY   [facility]    Anesthesia Start:  905 Anesthesia Stop:      Procedure:   SECTION REPEAT WITH TUBAL (N/A Abdomen) Diagnosis:      Surgeon:  Juan Avilez MD Provider:  Gela Newsome DO    Anesthesia Type:  spinal, ITN ASA Status:  3 - Emergent          Anesthesia Type: spinal, ITN  Last vitals  BP   127/69 (18 0800)   Temp   98.1 °F (36.7 °C) (18 0400)   Pulse   101 (18 0800)   Resp   20 (18 0800)     SpO2   95 % (18 1127)     Post Anesthesia Care and Evaluation    Patient location during evaluation: bedside  Patient participation: complete - patient participated  Level of consciousness: awake  Pain score: 5  Pain management: satisfactory to patient  Airway patency: patent  Anesthetic complications: No anesthetic complications  PONV Status: none  Cardiovascular status: acceptable and hemodynamically stable  Respiratory status: acceptable  Hydration status: acceptable  Post Neuraxial Block status: Motor and sensory function returned to baseline and No signs or symptoms of PDPH

## 2018-06-06 NOTE — PROGRESS NOTES
[facility] [place]    2018    Name:Marcela Perales    MR#:[id number]     PROGRESS NOTE:  Post-Op 1 S/P    HD:1    Subjective   23 y.o. yo Female  s/p CS at 36w4d doing well. Pain well controlled. Tolerating regular diet but not passing flatus. Lochia normal.     Patient Active Problem List   Diagnosis   • Gestational diabetes   • Morbid obesity with body mass index (BMI) of 40.0 to 44.9 in adult   • Encounter for sterilization   • Postpartum care following  delivery        Objective    Vitals  Temp:  Temp:  [97.5 °F (36.4 °C)-98.2 °F (36.8 °C)] 98.1 °F (36.7 °C)  Temp src: Oral  BP:  BP: ()/(36-87) 129/65  Pulse:  Heart Rate:  [] 97  RR:   Resp:  [16-20] 20    General Awake, alert, no distress  Abdomen Soft, non-distended, fundus firm, -2 below umbilicus, appropriately tender  Incision  Intact, no erythema or exudate  Extremities Calves NT bilaterally     I/O last 3 completed shifts:  In: 3100 [I.V.:3100]  Out: 4500 [Urine:3700; Blood:800]    LABS:   Lab Results   Component Value Date    WBC 7.25 2018    HGB 11.3 (L) 2018    HCT 34.0 (L) 2018    MCV 80.2 2018     2018     CBC ordered but not collected this AM    Infant: female       Assessment   1.  POD 1    Plan: Doing well.  Routine postoperative care      Active Problems:   None      Juan Avilez MD  2018 7:52 AM

## 2018-06-07 LAB
BACTERIA SPEC AEROBE CULT: NORMAL
CYTO UR: NORMAL
GLUCOSE BLDC GLUCOMTR-MCNC: 111 MG/DL (ref 70–130)
GLUCOSE BLDC GLUCOMTR-MCNC: 111 MG/DL (ref 70–130)
GLUCOSE BLDC GLUCOMTR-MCNC: 142 MG/DL (ref 70–130)
LAB AP CASE REPORT: NORMAL
LAB AP CLINICAL INFORMATION: NORMAL
Lab: NORMAL
PATH REPORT.FINAL DX SPEC: NORMAL
PATH REPORT.GROSS SPEC: NORMAL

## 2018-06-07 PROCEDURE — 99024 POSTOP FOLLOW-UP VISIT: CPT | Performed by: OBSTETRICS & GYNECOLOGY

## 2018-06-07 PROCEDURE — 82962 GLUCOSE BLOOD TEST: CPT

## 2018-06-07 RX ADMIN — HYDROCODONE BITARTRATE AND ACETAMINOPHEN 1 TABLET: 5; 325 TABLET ORAL at 00:04

## 2018-06-07 RX ADMIN — IBUPROFEN 600 MG: 600 TABLET ORAL at 03:53

## 2018-06-07 RX ADMIN — SIMETHICONE CHEW TAB 80 MG 80 MG: 80 TABLET ORAL at 08:17

## 2018-06-07 RX ADMIN — OXYCODONE HYDROCHLORIDE AND ACETAMINOPHEN 1 TABLET: 5; 325 TABLET ORAL at 12:29

## 2018-06-07 RX ADMIN — IBUPROFEN 600 MG: 600 TABLET ORAL at 22:34

## 2018-06-07 RX ADMIN — SIMETHICONE CHEW TAB 80 MG 80 MG: 80 TABLET ORAL at 21:12

## 2018-06-07 RX ADMIN — OXYCODONE HYDROCHLORIDE AND ACETAMINOPHEN 1 TABLET: 5; 325 TABLET ORAL at 08:17

## 2018-06-07 RX ADMIN — OXYCODONE HYDROCHLORIDE AND ACETAMINOPHEN 1 TABLET: 5; 325 TABLET ORAL at 21:11

## 2018-06-07 RX ADMIN — DOCUSATE SODIUM 100 MG: 100 CAPSULE, LIQUID FILLED ORAL at 22:36

## 2018-06-07 RX ADMIN — IBUPROFEN 600 MG: 600 TABLET ORAL at 10:30

## 2018-06-07 RX ADMIN — PRENATAL VIT W/ FE FUMARATE-FA TAB 27-0.8 MG 1 TABLET: 27-0.8 TAB at 08:17

## 2018-06-07 RX ADMIN — OXYCODONE HYDROCHLORIDE AND ACETAMINOPHEN 1 TABLET: 5; 325 TABLET ORAL at 03:55

## 2018-06-07 NOTE — PROGRESS NOTES
[facility] [place]    2018    Name:Marcela Perales    MR#:[id number]     PROGRESS NOTE:  Post-Op 2 S/P    HD:2    Subjective   23 y.o. yo Female  s/p CS at 36w4d doing well. Pain well controlled. Tolerating regular diet and having flatus. Lochia normal.     Patient Active Problem List   Diagnosis   • Gestational diabetes   • Morbid obesity with body mass index (BMI) of 40.0 to 44.9 in adult   • Encounter for sterilization   • Postpartum care following  delivery        Objective    Vitals  Temp:  Temp:  [97.6 °F (36.4 °C)-98.9 °F (37.2 °C)] 98.8 °F (37.1 °C)  Temp src: Oral  BP:  BP: (115-137)/(64-79) 121/72  Pulse:  Heart Rate:  [89-99] 99  RR:   Resp:  [14-20] 16  Per report. Unavailable for exam  General Awake, alert, no distress  Abdomen Soft, non-distended, fundus firm, is below umbilicus, appropriately tender  Incision  Intact, no erythema or exudate  Extremities Calves NT bilaterally     I/O last 3 completed shifts:  In: 1000 [I.V.:1000]  Out: 2850 [Urine:2850]    LABS:   Lab Results   Component Value Date    WBC 8.11 2018    HGB 10.8 (L) 2018    HCT 33.2 (L) 2018    MCV 80.2 2018     2018       Infant: female       Assessment   1.  POD 2    Plan: Doing well.  Routine postoperative care      Active Problems:   None      Rodri Low MD  2018 5:17 PM

## 2018-06-07 NOTE — PLAN OF CARE
Problem: Patient Care Overview  Goal: Plan of Care Review  Outcome: Ongoing (interventions implemented as appropriate)   18 0746   Coping/Psychosocial   Plan of Care Reviewed With patient   Plan of Care Review   Progress improving   OTHER   Outcome Summary doing well, ambulating well in room,       Problem: Postpartum ( Delivery) (Adult,Obstetrics,Pediatric)  Goal: Signs and Symptoms of Listed Potential Problems Will be Absent, Minimized or Managed (Postpartum)  Outcome: Ongoing (interventions implemented as appropriate)      Problem: Breastfeeding (Adult,Obstetrics,Pediatric)  Goal: Signs and Symptoms of Listed Potential Problems Will be Absent, Minimized or Managed (Breastfeeding)  Outcome: Ongoing (interventions implemented as appropriate)

## 2018-06-08 PROCEDURE — 99024 POSTOP FOLLOW-UP VISIT: CPT | Performed by: OBSTETRICS & GYNECOLOGY

## 2018-06-08 RX ORDER — BISACODYL 10 MG
10 SUPPOSITORY, RECTAL RECTAL ONCE
Status: COMPLETED | OUTPATIENT
Start: 2018-06-08 | End: 2018-06-08

## 2018-06-08 RX ADMIN — IBUPROFEN 600 MG: 600 TABLET ORAL at 04:34

## 2018-06-08 RX ADMIN — OXYCODONE HYDROCHLORIDE AND ACETAMINOPHEN 1 TABLET: 5; 325 TABLET ORAL at 10:22

## 2018-06-08 RX ADMIN — SIMETHICONE CHEW TAB 80 MG 80 MG: 80 TABLET ORAL at 21:48

## 2018-06-08 RX ADMIN — OXYCODONE HYDROCHLORIDE AND ACETAMINOPHEN 1 TABLET: 5; 325 TABLET ORAL at 19:43

## 2018-06-08 RX ADMIN — SIMETHICONE CHEW TAB 80 MG 80 MG: 80 TABLET ORAL at 10:22

## 2018-06-08 RX ADMIN — DOCUSATE SODIUM 100 MG: 100 CAPSULE, LIQUID FILLED ORAL at 19:43

## 2018-06-08 RX ADMIN — OXYCODONE HYDROCHLORIDE AND ACETAMINOPHEN 1 TABLET: 5; 325 TABLET ORAL at 05:50

## 2018-06-08 RX ADMIN — IBUPROFEN 600 MG: 600 TABLET ORAL at 19:43

## 2018-06-08 RX ADMIN — OXYCODONE HYDROCHLORIDE AND ACETAMINOPHEN 1 TABLET: 5; 325 TABLET ORAL at 15:35

## 2018-06-08 RX ADMIN — IBUPROFEN 600 MG: 600 TABLET ORAL at 10:22

## 2018-06-08 RX ADMIN — BISACODYL 10 MG: 10 SUPPOSITORY RECTAL at 21:48

## 2018-06-08 RX ADMIN — OXYCODONE HYDROCHLORIDE AND ACETAMINOPHEN 1 TABLET: 5; 325 TABLET ORAL at 01:48

## 2018-06-08 NOTE — LACTATION NOTE
"   06/07/18 1630   Maternal Information   Date of Referral 06/07/18   Person Making Referral (fu consult)   Infant Reason for Referral NICU admission   Mom states she has been pumping every 3 hours but not getting anything yet with pumping. Was able to put baby to the breast in NICU earlier today. Encouraged to continue pumping every 3 hours and to watch online video \"Maximizing Milk Production\" by [facility]. To call lactation services if she has concerns or questions. She has breast pump to use when baby goes home.   "

## 2018-06-08 NOTE — PROGRESS NOTES
[facility] [place]    2018    Name:Marcela Perales    MR#:[id number]     PROGRESS NOTE:  Post-Op 3 S/P    HD:3    Subjective   23 y.o. yo Female  s/p CS at 36w4d doing well. Pain well controlled. Tolerating regular diet and having flatus. Lochia normal.     Patient Active Problem List   Diagnosis   • Gestational diabetes   • Morbid obesity with body mass index (BMI) of 40.0 to 44.9 in adult   • Encounter for sterilization   • Postpartum care following  delivery        Objective    Vitals  Temp:  Temp:  [98 °F (36.7 °C)-98.9 °F (37.2 °C)] 98 °F (36.7 °C)  Temp src: Oral  BP:  BP: (121-137)/(72-86) 130/86  Pulse:  Heart Rate:  [] 97  RR:   Resp:  [14-18] 18    General Awake, alert, no distress  Abdomen Soft, non-distended, fundus firm, -2 below umbilicus, appropriately tender  Incision  Intact, no erythema or exudate  Extremities Calves NT bilaterally     I/O last 3 completed shifts:  In: -   Out: 1000 [Urine:1000]    LABS:   Lab Results   Component Value Date    WBC 8.11 2018    HGB 10.8 (L) 2018    HCT 33.2 (L) 2018    MCV 80.2 2018     2018       Infant: female       Assessment   1.  POD 3    Plan: Doing well.  Routine postoperative care, home tomorrow      Active Problems:   None      Juan Avilez MD  2018 7:58 AM

## 2018-06-09 VITALS
BODY MASS INDEX: 42.17 KG/M2 | WEIGHT: 247 LBS | HEIGHT: 64 IN | TEMPERATURE: 98.5 F | OXYGEN SATURATION: 95 % | RESPIRATION RATE: 16 BRPM | SYSTOLIC BLOOD PRESSURE: 136 MMHG | DIASTOLIC BLOOD PRESSURE: 88 MMHG | HEART RATE: 94 BPM

## 2018-06-09 LAB — GLUCOSE BLDC GLUCOMTR-MCNC: 108 MG/DL (ref 70–130)

## 2018-06-09 PROCEDURE — 99024 POSTOP FOLLOW-UP VISIT: CPT | Performed by: OBSTETRICS & GYNECOLOGY

## 2018-06-09 PROCEDURE — 82962 GLUCOSE BLOOD TEST: CPT

## 2018-06-09 RX ORDER — OXYCODONE HYDROCHLORIDE AND ACETAMINOPHEN 5; 325 MG/1; MG/1
1 TABLET ORAL EVERY 4 HOURS PRN
Qty: 25 TABLET | Refills: 0 | Status: SHIPPED | OUTPATIENT
Start: 2018-06-09 | End: 2018-06-15

## 2018-06-09 RX ORDER — NALOXONE HCL 0.4 MG/ML
0.4 VIAL (ML) INJECTION
Status: DISCONTINUED | OUTPATIENT
Start: 2018-06-09 | End: 2018-06-09 | Stop reason: HOSPADM

## 2018-06-09 RX ORDER — SODIUM PHOSPHATE, DIBASIC AND SODIUM PHOSPHATE, MONOBASIC 7; 19 G/133ML; G/133ML
1 ENEMA RECTAL ONCE AS NEEDED
Status: DISCONTINUED | OUTPATIENT
Start: 2018-06-09 | End: 2018-06-09 | Stop reason: HOSPADM

## 2018-06-09 RX ORDER — IBUPROFEN 600 MG/1
600 TABLET ORAL EVERY 6 HOURS PRN
Qty: 25 TABLET | Refills: 2 | Status: SHIPPED | OUTPATIENT
Start: 2018-06-09 | End: 2021-11-02

## 2018-06-09 RX ORDER — PSEUDOEPHEDRINE HCL 30 MG
100 TABLET ORAL 2 TIMES DAILY
Qty: 60 EACH | Refills: 2 | Status: SHIPPED | OUTPATIENT
Start: 2018-06-09 | End: 2021-11-02

## 2018-06-09 RX ADMIN — OXYCODONE HYDROCHLORIDE AND ACETAMINOPHEN 1 TABLET: 5; 325 TABLET ORAL at 08:14

## 2018-06-09 RX ADMIN — IBUPROFEN 600 MG: 600 TABLET ORAL at 02:08

## 2018-06-09 RX ADMIN — OXYCODONE HYDROCHLORIDE AND ACETAMINOPHEN 1 TABLET: 5; 325 TABLET ORAL at 00:26

## 2018-06-09 RX ADMIN — IBUPROFEN 600 MG: 600 TABLET ORAL at 08:14

## 2018-06-09 RX ADMIN — OXYCODONE HYDROCHLORIDE AND ACETAMINOPHEN 1 TABLET: 5; 325 TABLET ORAL at 13:47

## 2018-06-09 RX ADMIN — DOCUSATE SODIUM 100 MG: 100 CAPSULE, LIQUID FILLED ORAL at 08:15

## 2018-06-09 RX ADMIN — GLYCERIN 2 G: 2 SUPPOSITORY RECTAL at 06:24

## 2018-06-09 RX ADMIN — IBUPROFEN 600 MG: 600 TABLET ORAL at 14:30

## 2018-06-09 RX ADMIN — PRENATAL VIT W/ FE FUMARATE-FA TAB 27-0.8 MG 1 TABLET: 27-0.8 TAB at 08:14

## 2018-06-09 RX ADMIN — SIMETHICONE CHEW TAB 80 MG 80 MG: 80 TABLET ORAL at 08:15

## 2018-06-09 RX ADMIN — SIMETHICONE CHEW TAB 80 MG 80 MG: 80 TABLET ORAL at 13:48

## 2018-06-09 NOTE — LACTATION NOTE
This note was copied from a baby's chart.  Patient's mother indicated that she did not pump overnight.  She was encouraged to pump every 3 hours or risk a low milk supply.  Infant breast feeds very well with audible swallowing.

## 2018-06-09 NOTE — DISCHARGE SUMMARY
[facility] [place]   section Discharge Summary    Date of Admission: 2018  Date of Discharge:  2018      Patient: Marcela Perales      MR#:[id number]    Surgeon/OB: Juan Avilez MD    Discharge Diagnosis:  section at 36w4d, uncomplicated recovery    Procedures:  , Low Transverse     2018    9:40 AM      Anesthesia:  Spinal     Presenting Problem/History of Present Illness   uterine contractions in third trimester, antepartum [O47.03]   labor in third trimester with  delivery [O60.14X0]     Patient Active Problem List   Diagnosis   • Gestational diabetes   • Morbid obesity with body mass index (BMI) of 40.0 to 44.9 in adult   • Encounter for sterilization   • Postpartum care following  delivery   •  labor in third trimester with  delivery       Hospital Course  Patient is a 23 y.o. female  at 36w4d status post  section with uneventful postoperative recovery.  Patient was advanced to regular diet on postoperative day#1.  On discharge, ambulating, tolerating a regular diet without any difficulties and her incision is dry, clean and intact. The staples were removed and replaced with Steri-Strips.  Incision appears to be healing well.  The patient had a bilateral tubal ligation for birth control.  Patient's blood sugars have been in the 130 to 150 range.  She monitor blood sugars at home and return in 2 weeks.  If they continue to be abnormal will refer to primary care endocrinology for follow-up.    Infant:   female  fetus 4510 g (9 lb 15.1 oz)  with Apgar scores of 4  , 7   at five minutes.    Condition on Discharge:  Stable    Vital Signs  Temp:  [98.3 °F (36.8 °C)-98.9 °F (37.2 °C)] 98.5 °F (36.9 °C)  Heart Rate:  [] 94  Resp:  [16-20] 16  BP: (128-136)/(87-90) 136/88    Lab Results   Component Value Date    WBC 8.11 2018    HGB 10.8 (L) 2018    HCT  33.2 (L) 06/06/2018    MCV 80.2 06/06/2018     06/06/2018     The patient is B+, rubella immune, and hepatitis B, C negative    Discharge Disposition  Home or Self Care    Discharge Medications   Marcela Perales   [place] Medication Instructions LAKISHA:[id number]    Printed on:06/09/18 0918   Medication Information                      docusate sodium 100 MG capsule  Take 100 mg by mouth 2 (Two) Times a Day.             ibuprofen (ADVIL,MOTRIN) 600 MG tablet  Take 1 tablet by mouth Every 6 (Six) Hours As Needed for Mild Pain .             oxyCODONE-acetaminophen (PERCOCET) 5-325 MG per tablet  Take 1 tablet by mouth Every 4 (Four) Hours As Needed for Severe Pain  for up to 6 days.             Prenatal Vit-Fe Fumarate-FA (PNV PRENATAL PLUS MULTIVITAMIN) 27-1 MG tablet  Take 1 tablet by mouth Daily.                 Discharge Diet:   Diet Instructions     Advance Diet As Tolerated             Activity at Discharge:   Activity Instructions     Discharge Activity Restrictions       1) No driving for 2 weeks and no longer taking narcotics.   2) Return to school / work in 6 weeks.  3) May shower   4) Do not lift / push / pull more then 15 lbs.    Pelvic Rest       Number of days:  30          Follow-up Appointments  No future appointments.  Additional Instructions for the Follow-ups that You Need to Schedule     Discharge Follow-up with Specified Provider: Dr. Avilez; 2 Weeks    As directed      To:  Dr. Avilez    Follow Up:  2 Weeks                 Juan Avilez MD

## 2018-06-14 ENCOUNTER — TELEPHONE (OUTPATIENT)
Dept: OBSTETRICS AND GYNECOLOGY | Facility: CLINIC | Age: 23
End: 2018-06-14

## 2018-06-14 NOTE — TELEPHONE ENCOUNTER
The patient's call was returned but there was no answer.  However a message was left to return the call.    Juan Avilez MD

## 2018-06-19 ENCOUNTER — POSTPARTUM VISIT (OUTPATIENT)
Dept: OBSTETRICS AND GYNECOLOGY | Facility: CLINIC | Age: 23
End: 2018-06-19

## 2018-06-19 VITALS
WEIGHT: 218 LBS | BODY MASS INDEX: 37.22 KG/M2 | HEIGHT: 64 IN | DIASTOLIC BLOOD PRESSURE: 90 MMHG | SYSTOLIC BLOOD PRESSURE: 130 MMHG

## 2018-06-19 DIAGNOSIS — Z98.890 POST-OPERATIVE STATE: ICD-10-CM

## 2018-06-19 PROCEDURE — 0503F POSTPARTUM CARE VISIT: CPT | Performed by: OBSTETRICS & GYNECOLOGY

## 2018-06-19 NOTE — PROGRESS NOTES
Subjective   Marcela Perales is a 23 y.o. female is here today for follow-up.    History of Present Illness  Patient is 2 weeks post repeat  section bilateral tubal ligation.  She is breast-feeding.  She has no postpartum depression.  Patient has stopped doing glucoses but was encouraged to check blood sugar 2-3 times per week.  Will repeat GTT after her 6 week checkup.  The following portions of the patient's history were reviewed and updated as appropriate: allergies, current medications, past family history, past medical history, past social history, past surgical history and problem list.    Review of Systems   Constitutional: Negative.    Respiratory: Negative.    Cardiovascular: Negative.    Gastrointestinal: Negative.    Genitourinary: Positive for vaginal bleeding. Negative for decreased urine volume, difficulty urinating, dyspareunia, dysuria, enuresis, flank pain, frequency, genital sores, hematuria, menstrual problem, pelvic pain, urgency, vaginal discharge and vaginal pain.   Psychiatric/Behavioral: Negative.        Objective   Physical Exam   Constitutional: She appears well-developed and well-nourished.   Abdominal:       Nursing note and vitals reviewed.        Assessment/Plan   Marcela was seen today for postpartum care.    Diagnoses and all orders for this visit:    Postpartum care and examination of lactating mother    Post-operative state    Gestational diabetes mellitus, postpartum      Return in 4 weeks  Will screen for diabetes again after 6 weeks checkup    Juan Avilez MD

## 2021-09-08 NOTE — OP NOTE
Operative Note    Patient name: Marcela Perales  YOB: 1995   MRN: [id number]  Admission Date: 2018  Referring Provider: Juan Avilez MD    ID: [id number] y.o.  at 36w4d    Preoperative Diagnosis:   Patient Active Problem List   Diagnosis   • Gestational diabetes   • Morbid obesity with body mass index (BMI) of 40.0 to 44.9 in adult   • Encounter for sterilization   • Postpartum care following  delivery       Postoperative Diagnosis: Same as above.    Procedure(s): repeatlow transverse  delivery     Surgeons: Surgeon(s) and Role:     * Juan Avilez MD - Primary    Anesthesia: Spinal    Estimated Blood Loss: 800 mL mL    IV Fluids: 2100 mls    Preoperative antibiotic: Ancef (cefazolin) 2 grams    Blood products:   Blood Administration Record     None          Pathology:   Order Name Source Comment Collection Info Order Time   URINE DRUG SCREEN Urine, Clean Catch  Collected By: Mariam Flynn RN 2018  8:26 AM   TISSUE PATHOLOGY EXAM Fallopian Tubes, Bilateral  Collected By: Juan Avilez MD 2018  9:52 AM       Drains: Ahn catheter to gravity    Complications: None    Condition: Stable to recovery room                                          Infant:                 Gender: female  infant    Weight: 4510 g (9 lb 15.1 oz)     Apgars: 4   @ 1 minute /     7   @ 5 minutes    Cord gases: Venous:  @BABYNOHDR(BRIEFLAB, PHCVEN, BECVEN)@     Arterial:  @BABYNOHDR(BRIEFLAB, PHCART, BECART)@         Operative Summary:   After obtaining informed consent the patient was taken to the operating room where adequate anesthesia was obtained.  Ahn catheter was placed in the bladder preoperatively.  IV antibiotics were given preoperatively.       The abdomen was prepped and draped in the usual sterile fashion for  delivery.  After confirming adequate anesthesia a Pfannenstiel skin incision was made with the scalpel and carried through to the underlying layer of fascia.   The fascia was incised in the midline and the incision extended laterally with the Garcia scissors and with blunt dissection.       The upper aspect of the fascia was grasped with 2 Kocher clamps, elevated, and dissected off the underlying rectus muscles bluntly and with the Garcia scissors.  The Kocher clamps were removed and applied to the inferior aspect of the fascia.  The fascia was dissected off of the rectus muscles in the same fashion.  The peritoneum was entered bluntly.  The incision was stretched and the bladder blade and Dye retractor inserted for visualization of the uterus.       The uterus was incised with the scalpel in a low transverse fashion.  The uterine incision was entered digitally and the incision extended bluntly in a cranial-caudal fashion.  Retractors were removed and membranes were ruptured.  The infant was delivered atraumatically from vertex presentation.  The umbilical cord was clamped and cut and the nose and mouth bulb suctioned.  The infant was handed off to waiting pediatric staff.       Cord blood gases were not collected.  Cord blood was collected.  The placenta was removed using cord traction and uterine massage.  The uterus was exteriorized and cleared of all clots and debris.  The uterine incision was repaired with #1 Chromic gut in a running locked fashion. A single-layer technique was used.  Additional hemostatic measures required: electrocautery.    The incision was inspected and excellent hemostasis was noted.  The tubes and ovaries were noted to be normal.  The right fallopian tube was identified.  Right fallopian tube was picked up with a Leyla clamp The right fallopian tube was doubly ligated with O plain and 1 cm section removed and sent to pathology for examination.  The left fallopian tube was identified.  The left fallopian tube was picked up with [place] clamp.  The left fallopian tube was then doubly ligated with O plain and a 1 cm section removed since  pathology for examination  The appendix was not visualized..  The uterus was returned to the abdomen.  The gutters were cleared of all clots and debris.  Irrigation was not used.  The uterine incision was again inspected and found to be hemostatic.       The peritoneum was reapproximated with 2-0 Vicryl.  The fascia was closed with 0 Vicryl in a running fashion.  The subcutaneous space was reapproximated using 3-0 Plain gut.      The skin was closed using  Staples.  The patient was transferred to the recovery room in stable condition.    Juan Avilez MD  6/5/2018   yes

## 2021-11-02 ENCOUNTER — LAB (OUTPATIENT)
Dept: LAB | Facility: HOSPITAL | Age: 26
End: 2021-11-02

## 2021-11-02 ENCOUNTER — OFFICE VISIT (OUTPATIENT)
Dept: INTERNAL MEDICINE | Facility: CLINIC | Age: 26
End: 2021-11-02

## 2021-11-02 VITALS
HEIGHT: 65 IN | RESPIRATION RATE: 18 BRPM | HEART RATE: 102 BPM | WEIGHT: 206 LBS | DIASTOLIC BLOOD PRESSURE: 76 MMHG | SYSTOLIC BLOOD PRESSURE: 118 MMHG | TEMPERATURE: 97.2 F | OXYGEN SATURATION: 98 % | BODY MASS INDEX: 34.32 KG/M2

## 2021-11-02 DIAGNOSIS — N92.6 ABNORMAL MENSTRUAL PERIODS: ICD-10-CM

## 2021-11-02 DIAGNOSIS — R00.2 PALPITATION: ICD-10-CM

## 2021-11-02 DIAGNOSIS — R07.9 CHEST PAIN, UNSPECIFIED TYPE: Primary | ICD-10-CM

## 2021-11-02 DIAGNOSIS — N92.0 MENORRHAGIA WITH REGULAR CYCLE: ICD-10-CM

## 2021-11-02 LAB
BASOPHILS # BLD AUTO: 0.02 10*3/MM3 (ref 0–0.2)
BASOPHILS NFR BLD AUTO: 0.4 % (ref 0–1.5)
CHOLEST SERPL-MCNC: 201 MG/DL (ref 0–200)
D DIMER PPP FEU-MCNC: 0.36 MCGFEU/ML (ref 0–0.56)
DEPRECATED RDW RBC AUTO: 38.3 FL (ref 37–54)
EOSINOPHIL # BLD AUTO: 0.04 10*3/MM3 (ref 0–0.4)
EOSINOPHIL NFR BLD AUTO: 0.9 % (ref 0.3–6.2)
ERYTHROCYTE [DISTWIDTH] IN BLOOD BY AUTOMATED COUNT: 12.6 % (ref 12.3–15.4)
HCT VFR BLD AUTO: 43.9 % (ref 34–46.6)
HDLC SERPL-MCNC: 62 MG/DL (ref 40–60)
HGB BLD-MCNC: 14.2 G/DL (ref 12–15.9)
IMM GRANULOCYTES # BLD AUTO: 0.02 10*3/MM3 (ref 0–0.05)
IMM GRANULOCYTES NFR BLD AUTO: 0.4 % (ref 0–0.5)
LDLC SERPL CALC-MCNC: 115 MG/DL (ref 0–100)
LDLC/HDLC SERPL: 1.79 {RATIO}
LYMPHOCYTES # BLD AUTO: 1.9 10*3/MM3 (ref 0.7–3.1)
LYMPHOCYTES NFR BLD AUTO: 42 % (ref 19.6–45.3)
MCH RBC QN AUTO: 27.4 PG (ref 26.6–33)
MCHC RBC AUTO-ENTMCNC: 32.3 G/DL (ref 31.5–35.7)
MCV RBC AUTO: 84.6 FL (ref 79–97)
MONOCYTES # BLD AUTO: 0.31 10*3/MM3 (ref 0.1–0.9)
MONOCYTES NFR BLD AUTO: 6.9 % (ref 5–12)
NEUTROPHILS NFR BLD AUTO: 2.23 10*3/MM3 (ref 1.7–7)
NEUTROPHILS NFR BLD AUTO: 49.4 % (ref 42.7–76)
NRBC BLD AUTO-RTO: 0 /100 WBC (ref 0–0.2)
PLATELET # BLD AUTO: 291 10*3/MM3 (ref 140–450)
PMV BLD AUTO: 11.7 FL (ref 6–12)
RBC # BLD AUTO: 5.19 10*6/MM3 (ref 3.77–5.28)
TRIGL SERPL-MCNC: 139 MG/DL (ref 0–150)
TROPONIN T SERPL-MCNC: <0.01 NG/ML (ref 0–0.03)
VLDLC SERPL-MCNC: 24 MG/DL (ref 5–40)
WBC # BLD AUTO: 4.52 10*3/MM3 (ref 3.4–10.8)

## 2021-11-02 PROCEDURE — 80053 COMPREHEN METABOLIC PANEL: CPT | Performed by: NURSE PRACTITIONER

## 2021-11-02 PROCEDURE — 82670 ASSAY OF TOTAL ESTRADIOL: CPT | Performed by: NURSE PRACTITIONER

## 2021-11-02 PROCEDURE — 85379 FIBRIN DEGRADATION QUANT: CPT | Performed by: NURSE PRACTITIONER

## 2021-11-02 PROCEDURE — 83001 ASSAY OF GONADOTROPIN (FSH): CPT | Performed by: NURSE PRACTITIONER

## 2021-11-02 PROCEDURE — 84484 ASSAY OF TROPONIN QUANT: CPT | Performed by: NURSE PRACTITIONER

## 2021-11-02 PROCEDURE — 93000 ELECTROCARDIOGRAM COMPLETE: CPT | Performed by: NURSE PRACTITIONER

## 2021-11-02 PROCEDURE — 99203 OFFICE O/P NEW LOW 30 MIN: CPT | Performed by: NURSE PRACTITIONER

## 2021-11-02 PROCEDURE — 83002 ASSAY OF GONADOTROPIN (LH): CPT | Performed by: NURSE PRACTITIONER

## 2021-11-02 PROCEDURE — 80061 LIPID PANEL: CPT | Performed by: NURSE PRACTITIONER

## 2021-11-02 PROCEDURE — 82306 VITAMIN D 25 HYDROXY: CPT | Performed by: NURSE PRACTITIONER

## 2021-11-02 PROCEDURE — 84443 ASSAY THYROID STIM HORMONE: CPT | Performed by: NURSE PRACTITIONER

## 2021-11-02 PROCEDURE — 85025 COMPLETE CBC W/AUTO DIFF WBC: CPT | Performed by: NURSE PRACTITIONER

## 2021-11-02 PROCEDURE — 82607 VITAMIN B-12: CPT | Performed by: NURSE PRACTITIONER

## 2021-11-02 RX ORDER — PROPRANOLOL HYDROCHLORIDE 10 MG/1
10 TABLET ORAL 3 TIMES DAILY PRN
COMMUNITY
End: 2021-11-16 | Stop reason: SDUPTHER

## 2021-11-02 RX ORDER — BUSPIRONE HYDROCHLORIDE 7.5 MG/1
7.5 TABLET ORAL 2 TIMES DAILY
COMMUNITY
End: 2021-11-16 | Stop reason: SDUPTHER

## 2021-11-02 NOTE — PROGRESS NOTES
Subjective   Marcela Perales is a 26 y.o. female    Chief Complaint   Patient presents with   • Establish Care   • Chest Pain     x1 month, constant, denies any other symptom   • abnormal periods     since tubal 2018, having large clots, heavy bleeding and severe abdominal cramps, last ob visit was in 2018     New pt here to establish care    Chest Pain   This is a new problem. Episode onset: 1 month. The onset quality is gradual. The problem occurs constantly. The problem has been waxing and waning. The pain is present in the lateral region (left). The pain is at a severity of 3/10. The pain is moderate. The quality of the pain is described as dull (ache). The pain radiates to the left shoulder and mid back. Associated symptoms include abdominal pain (pelvic pain) and palpitations. Pertinent negatives include no back pain, claudication, cough, diaphoresis, dizziness, exertional chest pressure, fever, headaches, hemoptysis, irregular heartbeat, leg pain, lower extremity edema, malaise/fatigue, nausea, near-syncope, numbness, orthopnea, PND, shortness of breath, sputum production, syncope, vomiting or weakness. The pain is aggravated by emotional upset. Treatments tried: anxiety meds. The treatment provided mild relief. Risk factors include obesity, sedentary lifestyle and stress.   Pertinent negatives for past medical history include no aneurysm, no anxiety/panic attacks, no aortic aneurysm, no aortic dissection, no arrhythmia, no bicuspid aortic valve, no CAD, no cancer, no congenital heart disease, no connective tissue disease, no COPD, no CHF, no diabetes, no DVT, no hyperhomocysteinemia, no hyperlipidemia, no hypertension, no Kawasaki disease, no Marfan's syndrome, no MI, no mitral valve prolapse, no pacemaker, no PE, no PVD, no recent injury, no rheumatic fever, no seizures, no sickle cell disease, no sleep apnea, no spontaneous pneumothorax, no stimulant use, no strokes, no thyroid problem, no TIA, Shipley  syndrome and no valve disorder.   Her family medical history is significant for diabetes, heart disease and hypertension.   Pertinent negatives for family medical history include: no aortic dissection, no CAD, no connective tissue disease, no hyperlipidemia, no Marfan's syndrome, no early MI, no PE, no PVD, no sickle cell disease, no stroke, no sudden death and no TIA.   Menstrual Problem  This is a recurrent problem. Episode onset: 2018. The problem occurs intermittently (monthly). The problem has been gradually worsening. Associated symptoms include abdominal pain (pelvic pain) and chest pain. Pertinent negatives include no anorexia, arthralgias, change in bowel habit, chills, congestion, coughing, diaphoresis, fatigue, fever, headaches, joint swelling, myalgias, nausea, neck pain, numbness, rash, sore throat, swollen glands, urinary symptoms, vertigo, visual change, vomiting or weakness. Nothing aggravates the symptoms. She has tried nothing for the symptoms. The treatment provided no relief.      Past Medical History:   Diagnosis Date   • Acid reflux    • Anxiety    • Gestational diabetes     diet controlled   • Polyhydramnios    • PTSD (post-traumatic stress disorder)      Past Surgical History:   Procedure Laterality Date   •  SECTION PRIMARY  2018   •  SECTION WITH TUBAL N/A 2018    Procedure:  SECTION REPEAT WITH TUBAL;  Surgeon: Juan Avilez MD;  Location: [facility] DELIVERY;  Service: Obstetrics/Gynecology   • TUBAL ABDOMINAL LIGATION     • WISDOM TOOTH EXTRACTION       Family History   Problem Relation Age of Onset   • Diabetes Mother    • Hypertension Mother    • Diabetes Maternal Aunt    • Diabetes Maternal Uncle    • Diabetes Maternal Grandmother    • Heart failure Maternal Grandmother    • Hypertension Father    • Other Sister         pre-diabetes   • No Known Problems Brother    • Dementia Paternal Grandmother    • No Known Problems Paternal  Grandfather    • No Known Problems Son    • No Known Problems Daughter    • Breast cancer Neg Hx    • Ovarian cancer Neg Hx    • Uterine cancer Neg Hx    • Colon cancer Neg Hx      Social History     Socioeconomic History   • Marital status: Legally    Tobacco Use   • Smoking status: Former Smoker     Packs/day: 0.50     Types: Cigarettes     Quit date: 3/16/2016     Years since quittin.6   • Smokeless tobacco: Never Used   Vaping Use   • Vaping Use: Never used   Substance and Sexual Activity   • Alcohol use: Yes     Comment: socially   • Drug use: Not Currently     Types: Marijuana   • Sexual activity: Yes     Partners: Male     Birth control/protection: Surgical     No Known Allergies      The following portions of the patient's history were reviewed and updated as appropriate: allergies, current medications, past family history, past medical history, past social history, past surgical history and problem list.    Current Outpatient Medications:   •  busPIRone (BUSPAR) 7.5 MG tablet, Take 7.5 mg by mouth 2 (Two) Times a Day., Disp: , Rfl:   •  propranolol (INDERAL) 10 MG tablet, Take 10 mg by mouth 3 (Three) Times a Day As Needed (anxiety)., Disp: , Rfl:      Review of Systems   Constitutional: Negative for chills, diaphoresis, fatigue, fever and malaise/fatigue.   HENT: Negative for congestion and sore throat.    Respiratory: Negative for cough, hemoptysis, sputum production, chest tightness, shortness of breath and wheezing.    Cardiovascular: Positive for chest pain and palpitations. Negative for orthopnea, claudication, leg swelling, syncope, PND and near-syncope.   Gastrointestinal: Positive for abdominal pain (pelvic pain). Negative for anorexia, change in bowel habit, diarrhea, nausea and vomiting.   Endocrine: Negative for cold intolerance and heat intolerance.   Genitourinary: Positive for menstrual problem and pelvic pain.   Musculoskeletal: Negative for arthralgias, back pain, joint  "swelling, myalgias and neck pain.   Skin: Negative for rash.   Neurological: Negative for dizziness, vertigo, seizures, weakness, numbness and headaches.       Objective   Physical Exam  Constitutional:       Appearance: She is well-developed.   HENT:      Head: Normocephalic and atraumatic.   Eyes:      Conjunctiva/sclera: Conjunctivae normal.      Pupils: Pupils are equal, round, and reactive to light.   Cardiovascular:      Rate and Rhythm: Normal rate and regular rhythm.      Heart sounds: Normal heart sounds.   Pulmonary:      Effort: Pulmonary effort is normal.      Breath sounds: Normal breath sounds.   Abdominal:      General: Bowel sounds are normal.      Palpations: Abdomen is soft.      Tenderness: There is no abdominal tenderness.      Hernia: No hernia is present.   Musculoskeletal:         General: Normal range of motion.      Cervical back: Normal range of motion.   Skin:     General: Skin is warm and dry.   Neurological:      Mental Status: She is alert and oriented to person, place, and time.      Deep Tendon Reflexes: Reflexes are normal and symmetric.   Psychiatric:         Behavior: Behavior normal.         Thought Content: Thought content normal.         Judgment: Judgment normal.       Vitals:    11/02/21 0918   BP: 118/76   Cuff Size: Large Adult   Pulse: 102   Resp: 18   Temp: 97.2 °F (36.2 °C)   TempSrc: Infrared   SpO2: 98%   Weight: 93.4 kg (206 lb)   Height: 165.7 cm (65.25\")       ECG 12 Lead    Date/Time: 11/2/2021 2:30 PM  Performed by: Suzanne Kulkarni APRN  Authorized by: Suzanne Kulkarni APRN   Comparison: not compared with previous ECG   Previous ECG: no previous ECG available  Rhythm: sinus rhythm  Rate: normal  Conduction: conduction normal  ST Segments: ST segments normal  T Waves: T waves normal  QRS axis: normal  Other findings: non-specific ST-T wave changes    Clinical impression: normal ECG              Assessment/Plan   Diagnoses and all orders for this " visit:    1. Chest pain, unspecified type (Primary)  -     ECG 12 Lead  -     CBC & Differential; Future  -     Comprehensive Metabolic Panel; Future  -     Lipid Panel; Future  -     Vitamin B12; Future  -     Vitamin D 25 Hydroxy; Future  -     D-dimer, Quantitative; Future  -     Troponin; Future  -     Stress test with myocardial perfusion; Future  -     Holter monitor - 48 hour; Future  -     Adult Transthoracic Echo Complete W/ Cont if Necessary Per Protocol; Future  -     TSH Rfx On Abnormal To Free T4; Future  -     CBC & Differential  -     Comprehensive Metabolic Panel  -     Lipid Panel  -     Vitamin B12  -     Vitamin D 25 Hydroxy  -     D-dimer, Quantitative  -     Troponin  -     TSH Rfx On Abnormal To Free T4    2. Abnormal menstrual periods  -     TSH Rfx On Abnormal To Free T4; Future  -     Follicle Stimulating Hormone; Future  -     Estradiol; Future  -     Luteinizing Hormone; Future  -     US Non-ob Transvaginal; Future  -     TSH Rfx On Abnormal To Free T4  -     Follicle Stimulating Hormone  -     Estradiol  -     Luteinizing Hormone    3. Palpitation  -     Stress test with myocardial perfusion; Future  -     Holter monitor - 48 hour; Future  -     Adult Transthoracic Echo Complete W/ Cont if Necessary Per Protocol; Future  -     TSH Rfx On Abnormal To Free T4; Future  -     TSH Rfx On Abnormal To Free T4    4. Menorrhagia with regular cycle  -     Follicle Stimulating Hormone; Future  -     Estradiol; Future  -     Luteinizing Hormone; Future  -     US Non-ob Transvaginal; Future  -     Follicle Stimulating Hormone  -     Estradiol  -     Luteinizing Hormone    EKG within acceptable limits  Will send labs, D-dimer and Troponin sent STAT  Will set up for echo, stress and holter  Will ck labs to assess hormones  Will set up for TV US  To the ER with any worsening sx's  Return in about 4 weeks (around 11/30/2021) for Annual.

## 2021-11-03 LAB
25(OH)D3 SERPL-MCNC: 13.4 NG/ML
ALBUMIN SERPL-MCNC: 4.2 G/DL (ref 3.5–5.2)
ALBUMIN/GLOB SERPL: 1.4 G/DL
ALP SERPL-CCNC: 88 U/L (ref 39–117)
ALT SERPL W P-5'-P-CCNC: 12 U/L (ref 1–33)
ANION GAP SERPL CALCULATED.3IONS-SCNC: 9.6 MMOL/L (ref 5–15)
AST SERPL-CCNC: 13 U/L (ref 1–32)
BILIRUB SERPL-MCNC: <0.2 MG/DL (ref 0–1.2)
BUN SERPL-MCNC: 9 MG/DL (ref 6–20)
BUN/CREAT SERPL: 12.2 (ref 7–25)
CALCIUM SPEC-SCNC: 8.9 MG/DL (ref 8.6–10.5)
CHLORIDE SERPL-SCNC: 101 MMOL/L (ref 98–107)
CO2 SERPL-SCNC: 22.4 MMOL/L (ref 22–29)
CREAT SERPL-MCNC: 0.74 MG/DL (ref 0.57–1)
ESTRADIOL SERPL HS-MCNC: 107 PG/ML
FSH SERPL-ACNC: 2.59 MIU/ML
GFR SERPL CREATININE-BSD FRML MDRD: 115 ML/MIN/1.73
GLOBULIN UR ELPH-MCNC: 3 GM/DL
GLUCOSE SERPL-MCNC: 266 MG/DL (ref 65–99)
LH SERPL-ACNC: 3.06 MIU/ML
POTASSIUM SERPL-SCNC: 4.3 MMOL/L (ref 3.5–5.2)
PROT SERPL-MCNC: 7.2 G/DL (ref 6–8.5)
SODIUM SERPL-SCNC: 133 MMOL/L (ref 136–145)
TSH SERPL DL<=0.05 MIU/L-ACNC: 1.12 UIU/ML (ref 0.27–4.2)
VIT B12 BLD-MCNC: 976 PG/ML (ref 211–946)

## 2021-11-16 ENCOUNTER — CLINICAL SUPPORT (OUTPATIENT)
Dept: INTERNAL MEDICINE | Facility: CLINIC | Age: 26
End: 2021-11-16

## 2021-11-16 DIAGNOSIS — R73.09 ABNORMAL GLUCOSE: ICD-10-CM

## 2021-11-16 DIAGNOSIS — R73.09 ABNORMAL GLUCOSE: Primary | ICD-10-CM

## 2021-11-16 LAB
EXPIRATION DATE: ABNORMAL
HBA1C MFR BLD: 10.8 %
Lab: ABNORMAL

## 2021-11-16 PROCEDURE — 99211 OFF/OP EST MAY X REQ PHY/QHP: CPT | Performed by: NURSE PRACTITIONER

## 2021-11-16 PROCEDURE — 3046F HEMOGLOBIN A1C LEVEL >9.0%: CPT | Performed by: NURSE PRACTITIONER

## 2021-11-16 PROCEDURE — 83036 HEMOGLOBIN GLYCOSYLATED A1C: CPT | Performed by: NURSE PRACTITIONER

## 2021-11-16 RX ORDER — BUSPIRONE HYDROCHLORIDE 7.5 MG/1
7.5 TABLET ORAL 2 TIMES DAILY
Qty: 180 TABLET | Refills: 1 | Status: SHIPPED | OUTPATIENT
Start: 2021-11-16

## 2021-11-16 RX ORDER — PROPRANOLOL HYDROCHLORIDE 10 MG/1
10 TABLET ORAL 3 TIMES DAILY PRN
Qty: 270 TABLET | Refills: 1 | Status: SHIPPED | OUTPATIENT
Start: 2021-11-16

## 2021-11-16 NOTE — TELEPHONE ENCOUNTER
Caller: Marcela Perales    Relationship: Self    Best call back number:  [phone]    Requested Prescriptions:   Requested Prescriptions     Pending Prescriptions Disp Refills   • busPIRone (BUSPAR) 7.5 MG tablet       Sig: Take 1 tablet by mouth 2 (Two) Times a Day.   • propranolol (INDERAL) 10 MG tablet       Sig: Take 1 tablet by mouth 3 (Three) Times a Day As Needed (anxiety).        Pharmacy where request should be sent: [id number] - [facility] [address] [facility] [address] [facility] [phone] [facility] [phone]      Additional details provided by patient:     Does the patient have less than a 3 day supply:  [x] Yes  [] No

## 2021-11-16 NOTE — TELEPHONE ENCOUNTER
Pt notified of results. Went into great detail and pt verbalized understanding. She will stop by the office today to have her a1c checked. Her preferred pharmacy is [facility] on [address]. She has asked us if we can continue to write her anxiety medication. Those rxs are attached to this phone note. Please advise, thanks

## 2021-11-16 NOTE — TELEPHONE ENCOUNTER
----- Message from MAYDA Grimaldo sent at 11/16/2021  1:19 PM EST -----  Please let patient know that her vitamin D is very low.  I I need to send in a prescription strength vitamin D that she will take once a week for 8 weeks, but I do not have a pharmacy on file for her.  Can you ask where she would like this sent?  She will then need to  OTC vitamin D3 and take 5000 units daily.Blood sugar was very high, 266, I need her to stop by the office for an A1c ASAP.    All other labs are within acceptable limits.

## 2021-11-17 ENCOUNTER — TELEPHONE (OUTPATIENT)
Dept: INTERNAL MEDICINE | Facility: CLINIC | Age: 26
End: 2021-11-17

## 2021-11-17 NOTE — TELEPHONE ENCOUNTER
Caller: Marcela Perales    Relationship: Self    Best call back number: [phone]    What is the best time to reach you: ANYTIME    Who are you requesting to speak with (clinical staff, provider,  specific staff member): CLINICAL STAFF    Do you know the name of the person who called: SELF    What was the call regarding: PATIENT IS CALLING TO SPEAK WITH THE NURSE ABOUT HER A1C.    Do you require a callback: YES

## 2021-11-18 NOTE — TELEPHONE ENCOUNTER
Advised pt of information, we have her scheduled sooner than 12/6/21. She is to come in on 11/23/21

## 2021-11-18 NOTE — TELEPHONE ENCOUNTER
Yes, please let her know that her A1C does show that she is now diabetic.  She needs to schedule an ASAP appt so we can discuss and get started on meds.

## 2021-12-06 ENCOUNTER — OFFICE VISIT (OUTPATIENT)
Dept: INTERNAL MEDICINE | Facility: CLINIC | Age: 26
End: 2021-12-06

## 2021-12-06 VITALS
DIASTOLIC BLOOD PRESSURE: 80 MMHG | SYSTOLIC BLOOD PRESSURE: 104 MMHG | HEIGHT: 65 IN | BODY MASS INDEX: 33.82 KG/M2 | TEMPERATURE: 97 F | OXYGEN SATURATION: 98 % | RESPIRATION RATE: 20 BRPM | WEIGHT: 203 LBS | HEART RATE: 104 BPM

## 2021-12-06 DIAGNOSIS — E55.9 VITAMIN D DEFICIENCY: ICD-10-CM

## 2021-12-06 DIAGNOSIS — E66.9 CLASS 1 OBESITY WITHOUT SERIOUS COMORBIDITY WITH BODY MASS INDEX (BMI) OF 33.0 TO 33.9 IN ADULT, UNSPECIFIED OBESITY TYPE: ICD-10-CM

## 2021-12-06 DIAGNOSIS — E11.65 TYPE 2 DIABETES MELLITUS WITH HYPERGLYCEMIA, WITHOUT LONG-TERM CURRENT USE OF INSULIN (HCC): ICD-10-CM

## 2021-12-06 DIAGNOSIS — Z01.419 ROUTINE GYNECOLOGICAL EXAMINATION: ICD-10-CM

## 2021-12-06 DIAGNOSIS — Z00.00 ROUTINE GENERAL MEDICAL EXAMINATION AT A HEALTH CARE FACILITY: Primary | ICD-10-CM

## 2021-12-06 PROCEDURE — 2014F MENTAL STATUS ASSESS: CPT | Performed by: NURSE PRACTITIONER

## 2021-12-06 PROCEDURE — 99395 PREV VISIT EST AGE 18-39: CPT | Performed by: NURSE PRACTITIONER

## 2021-12-06 PROCEDURE — 3008F BODY MASS INDEX DOCD: CPT | Performed by: NURSE PRACTITIONER

## 2021-12-06 RX ORDER — SYRING-NEEDL,DISP,INSUL,0.3 ML 30 GX5/16"
SYRINGE, EMPTY DISPOSABLE MISCELLANEOUS
Qty: 100 EACH | Refills: 11 | Status: SHIPPED | OUTPATIENT
Start: 2021-12-06

## 2021-12-06 RX ORDER — CHOLECALCIFEROL (VITAMIN D3) 1250 MCG
50000 CAPSULE ORAL
Qty: 8 CAPSULE | Refills: 0 | Status: SHIPPED | OUTPATIENT
Start: 2021-12-06 | End: 2022-01-25

## 2021-12-06 RX ORDER — BLOOD-GLUCOSE METER
KIT MISCELLANEOUS
Qty: 1 EACH | Refills: 0 | Status: SHIPPED | OUTPATIENT
Start: 2021-12-06

## 2021-12-06 RX ORDER — DAPAGLIFLOZIN 10 MG/1
10 TABLET, FILM COATED ORAL DAILY
Qty: 30 TABLET | Refills: 2 | Status: SHIPPED | OUTPATIENT
Start: 2021-12-06 | End: 2021-12-07

## 2021-12-06 NOTE — PROGRESS NOTES
Subjective   Marcela Perales is a 26 y.o. female    Chief Complaint   Patient presents with   • Annual Exam     pap today   • Diabetes     A1C 10.8 last visit 21, taking natural supplements for her sugars     History of Present Illness     Here for physical with Pap    Pt had labs done with initial visit on 21.  BG was elevated. Came back for A1C, 10.8%.  Patient states that there is a strong family history of diabetes type 2 within her family.  She did deal with gestational diabetes during her pregnancy.  She is very reluctant to start medication.  She does not want to start Metformin due to kidney issues that it caused with her mother.    COVID -provided information  Flu shot -declines  Tdap -declines  Hep C screen -  Pap -updating today    Diet -could use improvement, working to lower carbs and sugar    Exercise -moderate    Social History     Tobacco Use   • Smoking status: Former Smoker     Packs/day: 0.50     Types: Cigarettes     Quit date: 3/16/2016     Years since quittin.7   • Smokeless tobacco: Never Used   Vaping Use   • Vaping Use: Never used   Substance Use Topics   • Alcohol use: Yes     Comment: socially   • Drug use: Not Currently     Types: Marijuana         The following portions of the patient's history were reviewed and updated as appropriate: allergies, current medications, past family history, past medical history, past social history, past surgical history and problem list.    Current Outpatient Medications:   •  ASHWAGANDHA PO, Take  by mouth. For anxiety, taking once a day, Disp: , Rfl:   •  Barberry-Oreg Grape-Goldenseal (BERBERINE COMPLEX PO), Take  by mouth. 1500 mg each meal, Disp: , Rfl:   •  Blood Glucose Monitoring Suppl (ONE TOUCH ULTRA 2) w/Device kit, USE AS DIRECTED TO CHECK FASTING SUGAR DAILY AND AS NEEDED, Disp: , Rfl:   •  busPIRone (BUSPAR) 7.5 MG tablet, Take 1 tablet by mouth 2 (Two) Times a Day., Disp: 180 tablet, Rfl: 1  •  Cholecalciferol  (Vitamin D3) 1.25 MG (23581 UT) capsule, Take 1 capsule by mouth Every 7 (Seven) Days for 8 doses., Disp: 8 capsule, Rfl: 0  •  Dapagliflozin Propanediol (Farxiga) 10 MG tablet, Take 10 mg by mouth Daily., Disp: 30 tablet, Rfl: 2  •  glucose blood test strip, Pt to ck FS daily and PRN.  Dx: E11.9, Disp: 100 each, Rfl: 11  •  glucose monitor monitoring kit, Pt to ck FS daily and PRN.  Dx: E11.9, Disp: 1 each, Rfl: 0  •  Lancet Device misc, Pt to ck FS daily and PRN. Dx: E11.9, Disp: 100 each, Rfl: 11  •  propranolol (INDERAL) 10 MG tablet, Take 1 tablet by mouth 3 (Three) Times a Day As Needed (anxiety)., Disp: 270 tablet, Rfl: 1     Review of Systems   Constitutional: Negative for appetite change, chills, fatigue, fever and unexpected weight change.   HENT: Negative for congestion, ear pain, nosebleeds, rhinorrhea and tinnitus.    Eyes: Negative for pain.   Respiratory: Negative for cough, chest tightness and shortness of breath.    Cardiovascular: Negative for chest pain, palpitations and leg swelling.   Gastrointestinal: Negative for abdominal distention, abdominal pain, blood in stool, constipation, diarrhea, nausea and vomiting.   Endocrine: Negative for cold intolerance, heat intolerance, polydipsia, polyphagia and polyuria.   Genitourinary: Negative for dysuria, flank pain, frequency, hematuria and urgency.   Musculoskeletal: Negative for arthralgias, back pain, gait problem, joint swelling, myalgias and neck pain.   Skin: Negative for color change, pallor, rash and wound.   Allergic/Immunologic: Negative for environmental allergies and food allergies.   Neurological: Negative for dizziness, syncope, weakness, light-headedness, numbness and headaches.   Hematological: Negative for adenopathy. Does not bruise/bleed easily.   Psychiatric/Behavioral: Negative for behavioral problems and suicidal ideas. The patient is not nervous/anxious.        Objective   Physical Exam  Vitals reviewed. Exam conducted with a  chaperone present.   Constitutional:       Appearance: Normal appearance. She is well-developed. She is obese.   HENT:      Head: Normocephalic and atraumatic.      Right Ear: External ear normal.      Left Ear: External ear normal.      Nose: Nose normal.      Mouth/Throat:      Mouth: Mucous membranes are moist.      Pharynx: Oropharynx is clear.   Eyes:      Conjunctiva/sclera: Conjunctivae normal.      Pupils: Pupils are equal, round, and reactive to light.   Cardiovascular:      Rate and Rhythm: Normal rate and regular rhythm.      Pulses: Normal pulses.           Carotid pulses are 2+ on the right side and 2+ on the left side.     Heart sounds: Normal heart sounds.   Pulmonary:      Effort: Pulmonary effort is normal.      Breath sounds: Normal breath sounds.   Chest:   Breasts: Breasts are symmetrical.      Right: No inverted nipple, mass, nipple discharge, skin change or tenderness.      Left: No inverted nipple, mass, nipple discharge, skin change or tenderness.       Abdominal:      General: Bowel sounds are normal.      Palpations: Abdomen is soft.   Genitourinary:     General: Normal vulva.      Pubic Area: No rash.       Vagina: Normal. No vaginal discharge.      Cervix: Normal.      Uterus: Normal.       Adnexa: Right adnexa normal and left adnexa normal.      Rectum: Normal. Guaiac result negative.   Musculoskeletal:         General: Normal range of motion.      Cervical back: Normal range of motion and neck supple.   Lymphadenopathy:      Head:      Right side of head: No tonsillar adenopathy.      Left side of head: No tonsillar adenopathy.      Cervical:      Right cervical: No superficial or posterior cervical adenopathy.     Left cervical: No superficial or posterior cervical adenopathy.   Skin:     General: Skin is warm and dry.      Capillary Refill: Capillary refill takes less than 2 seconds.   Neurological:      Mental Status: She is alert and oriented to person, place, and time.      Deep  "Tendon Reflexes: Reflexes are normal and symmetric.   Psychiatric:         Mood and Affect: Mood normal.         Behavior: Behavior normal.         Thought Content: Thought content normal.         Judgment: Judgment normal.       Vitals:    12/06/21 1041   BP: 104/80   Cuff Size: Large Adult   Pulse: 104   Resp: 20   Temp: 97 °F (36.1 °C)   TempSrc: Infrared   SpO2: 98%   Weight: 92.1 kg (203 lb)   Height: 165.7 cm (65.25\")     Body mass index is 33.52 kg/m².      Assessment/Plan   Diagnoses and all orders for this visit:    1. Routine general medical examination at a health care facility (Primary)    2. Routine gynecological examination  -     Liquid-based Pap Smear, Screening; Future  -     Liquid-based Pap Smear, Screening    3. Class 1 obesity without serious comorbidity with body mass index (BMI) of 33.0 to 33.9 in adult, unspecified obesity type    4. Type 2 diabetes mellitus with hyperglycemia, without long-term current use of insulin (HCC)  -     Discontinue: Dapagliflozin Propanediol (Farxiga) 10 MG tablet; Take 10 mg by mouth Daily.  Dispense: 30 tablet; Refill: 2  -     glucose monitor monitoring kit; Pt to ck FS daily and PRN.  Dx: E11.9  Dispense: 1 each; Refill: 0  -     glucose blood test strip; Pt to ck FS daily and PRN.  Dx: E11.9  Dispense: 100 each; Refill: 11  -     Lancet Device misc; Pt to ck FS daily and PRN. Dx: E11.9  Dispense: 100 each; Refill: 11    5. Vitamin D deficiency  -     Cholecalciferol (Vitamin D3) 1.25 MG (16039 UT) capsule; Take 1 capsule by mouth Every 7 (Seven) Days for 8 doses.  Dispense: 8 capsule; Refill: 0    Pap and breast exam done  Will start Farxiga 10 mg 1 p.o. daily.  Glucometer with all testing supplies sent to pharmacy  We will start vitamin D3 50,000 units 1 p.o. once a week x8 weeks.  Counseling-patient's (Body mass index is 33.52 kg/m².) indicates that they are obese (BMI >30) with health related conditions that include diabetes mellitus . Weight is improving " with lifestyle modifications. BMI is is above average; BMI management plan is completed. We discussed portion control and increasing exercise.   Return in about 3 months (around 3/6/2022).

## 2021-12-07 ENCOUNTER — TELEPHONE (OUTPATIENT)
Dept: INTERNAL MEDICINE | Facility: CLINIC | Age: 26
End: 2021-12-07

## 2021-12-07 NOTE — TELEPHONE ENCOUNTER
Caller: Marcela Perales    Relationship: Self    Best call back number: [phone]    What is the best time to reach you: ANYTIME    Who are you requesting to speak with (clinical staff, provider,  specific staff member): CLINICAL STAFF    Do you know the name of the person who called: ROSEANN    What was the call regarding: INSURANCE ISSUES FOR MEDICATION    Do you require a callback:

## 2021-12-07 NOTE — TELEPHONE ENCOUNTER
Caller: Diaz Marcelabenita King    Relationship: Self    Best call back number: [phone]     What medication are you requesting:DIABETIC PRESCRIPTION THAT WILL BE COVERED BY MY INSURANCE ([employer])    What are your current symptoms: TYPE II DIABETIC    How long have you been experiencing symptoms: 2 WEEKS    Have you had these symptoms before:    [x] Yes  [] No    Have you been treated for these symptoms before:   [x] Yes  [] No    If a prescription is needed, what is your preferred pharmacy and phone number:    [address]  Additional notes:PATIENT CALLED IN STATED THE PRESCRIPTION JARDIANCE IS NOT COVERED UNDER HER INSURANCE AND SHE IS REQUESTING A DIFFERENT MEDICATION THAT WILL BE COVERED

## 2021-12-07 NOTE — TELEPHONE ENCOUNTER
I would like for her to call her insurance and ask what equivalent that is covered.  Let us know and I will send this in.

## 2021-12-07 NOTE — TELEPHONE ENCOUNTER
PATIENT HAS CALLED AND STATED HER INSURANCE WILL NOT COVER   Dapagliflozin Propanediol (Farxiga) 10 MG tablet AND IS REQUESTING IF SOMETHING ELSE CAN BE CALLED IN THAT IS COVERED BY HER INSURANCE.    PATIENT USES [facility] ON [address]    CALL BACK NUMBER [phone]

## 2021-12-08 ENCOUNTER — PRIOR AUTHORIZATION (OUTPATIENT)
Dept: INTERNAL MEDICINE | Facility: CLINIC | Age: 26
End: 2021-12-08

## 2021-12-08 NOTE — TELEPHONE ENCOUNTER
SENT PA FOR BOTH FARXIGA AND JARDIANCE TO SEE IF INSURANCE WILL COVER EITHER.     RECEIVED AN APPROVAL FOR FARXIGA AND PT DID PREFER WE TRY FOR PA FOR THIS ONE FIRST. SHE WOULD RATHER TAKE THIS ONE AND NOT JARDIANCE.    WILL FAX APPROVAL TO PHARMACY, WILL YOU PLEASE RESEND RX FOR FARXIGA AND WILL CANCEL JARDIANCE PLEASE? THANKS    PA Case ID: [id number], APPROVED TODAY

## 2021-12-08 NOTE — TELEPHONE ENCOUNTER
Spoke with pt and states that her insurance states that she would need a PA to get either medication covered. She then said that the pharmacy had sent over that request for a PA yesterday. Advised pt I would work on pa's for both med today.     FARXIGA WAS APPROVED, PA ENCOUNTER CREATED

## 2021-12-09 ENCOUNTER — APPOINTMENT (OUTPATIENT)
Dept: CARDIOLOGY | Facility: HOSPITAL | Age: 26
End: 2021-12-09

## 2021-12-09 RX ORDER — DAPAGLIFLOZIN 10 MG/1
10 TABLET, FILM COATED ORAL DAILY
Qty: 30 TABLET | Refills: 2 | Status: SHIPPED | OUTPATIENT
Start: 2021-12-09 | End: 2022-03-01 | Stop reason: SDUPTHER

## 2021-12-14 RX ORDER — BLOOD-GLUCOSE METER
EACH MISCELLANEOUS
COMMUNITY
Start: 2021-12-06

## 2021-12-22 ENCOUNTER — TELEPHONE (OUTPATIENT)
Dept: INTERNAL MEDICINE | Facility: CLINIC | Age: 26
End: 2021-12-22

## 2021-12-28 ENCOUNTER — TELEPHONE (OUTPATIENT)
Dept: INTERNAL MEDICINE | Facility: CLINIC | Age: 26
End: 2021-12-28

## 2021-12-28 NOTE — TELEPHONE ENCOUNTER
In this case , let her take 5 mg ( 1/2 the pill ), is she still has the sx needs to hold on it and needs to follow with her PCP soon    Kevin Asif MD

## 2021-12-28 NOTE — TELEPHONE ENCOUNTER
Spoke with the patient, she said that she thinks her Farxiga dosage is to high (10MG). Her sugars are always dropping into the 90's and she is feeling dizzy and lightheaded. Please advise

## 2022-03-01 RX ORDER — DAPAGLIFLOZIN 10 MG/1
10 TABLET, FILM COATED ORAL DAILY
Qty: 30 TABLET | Refills: 0 | Status: SHIPPED | OUTPATIENT
Start: 2022-03-01 | End: 2023-03-07 | Stop reason: SDUPTHER

## 2022-03-01 NOTE — TELEPHONE ENCOUNTER
Caller: Marcela Perales    Relationship: Self    Best call back number:   [phone] (H)          Requested Prescriptions:   Requested Prescriptions     Pending Prescriptions Disp Refills   • Dapagliflozin Propanediol (Farxiga) 10 MG tablet 30 tablet 2     Sig: Take 10 mg by mouth Daily.        Pharmacy where request should be sent: [id number] [address] AT [facility] [address] [facility] [phone] [facility] [phone]      Additional details provided by patient: PATIENT IS OUT OF MEDICATION     Does the patient have less than a 3 day supply:  [x] Yes  [] No    David Nash Rep   03/01/22 13:21 EST

## 2022-07-08 RX ORDER — DAPAGLIFLOZIN 10 MG/1
TABLET, FILM COATED ORAL
Qty: 30 TABLET | Refills: 0 | OUTPATIENT
Start: 2022-07-08

## 2023-03-07 ENCOUNTER — OFFICE VISIT (OUTPATIENT)
Dept: INTERNAL MEDICINE | Facility: CLINIC | Age: 28
End: 2023-03-07
Payer: COMMERCIAL

## 2023-03-07 ENCOUNTER — LAB (OUTPATIENT)
Dept: INTERNAL MEDICINE | Facility: CLINIC | Age: 28
End: 2023-03-07
Payer: COMMERCIAL

## 2023-03-07 VITALS
HEART RATE: 86 BPM | TEMPERATURE: 98.2 F | RESPIRATION RATE: 18 BRPM | BODY MASS INDEX: 30.82 KG/M2 | HEIGHT: 65 IN | SYSTOLIC BLOOD PRESSURE: 124 MMHG | DIASTOLIC BLOOD PRESSURE: 90 MMHG | WEIGHT: 185 LBS | OXYGEN SATURATION: 99 %

## 2023-03-07 DIAGNOSIS — E11.65 TYPE 2 DIABETES MELLITUS WITH HYPERGLYCEMIA, WITHOUT LONG-TERM CURRENT USE OF INSULIN: ICD-10-CM

## 2023-03-07 DIAGNOSIS — Z00.00 ANNUAL PHYSICAL EXAM: ICD-10-CM

## 2023-03-07 DIAGNOSIS — Z00.00 ANNUAL PHYSICAL EXAM: Primary | ICD-10-CM

## 2023-03-07 DIAGNOSIS — E55.9 VITAMIN D DEFICIENCY: ICD-10-CM

## 2023-03-07 LAB
ALBUMIN SERPL-MCNC: 4 G/DL (ref 3.5–5.2)
ALBUMIN/GLOB SERPL: 1.3 G/DL
ALP SERPL-CCNC: 71 U/L (ref 39–117)
ALT SERPL W P-5'-P-CCNC: 14 U/L (ref 1–33)
ANION GAP SERPL CALCULATED.3IONS-SCNC: 11 MMOL/L (ref 5–15)
AST SERPL-CCNC: 14 U/L (ref 1–32)
BILIRUB BLD-MCNC: NEGATIVE MG/DL
BILIRUB SERPL-MCNC: 0.2 MG/DL (ref 0–1.2)
BUN SERPL-MCNC: 12 MG/DL (ref 6–20)
BUN/CREAT SERPL: 16.9 (ref 7–25)
CALCIUM SPEC-SCNC: 9.3 MG/DL (ref 8.6–10.5)
CHLORIDE SERPL-SCNC: 102 MMOL/L (ref 98–107)
CHOLEST SERPL-MCNC: 180 MG/DL (ref 0–200)
CLARITY, POC: CLEAR
CO2 SERPL-SCNC: 25 MMOL/L (ref 22–29)
COLOR UR: YELLOW
CREAT SERPL-MCNC: 0.71 MG/DL (ref 0.57–1)
EGFRCR SERPLBLD CKD-EPI 2021: 119.7 ML/MIN/1.73
EXPIRATION DATE: NORMAL
EXPIRATION DATE: NORMAL
GLOBULIN UR ELPH-MCNC: 3.1 GM/DL
GLUCOSE SERPL-MCNC: 104 MG/DL (ref 65–99)
GLUCOSE UR STRIP-MCNC: NEGATIVE MG/DL
HBA1C MFR BLD: 6 %
HDLC SERPL-MCNC: 80 MG/DL (ref 40–60)
KETONES UR QL: NEGATIVE
LDLC SERPL CALC-MCNC: 92 MG/DL (ref 0–100)
LDLC/HDLC SERPL: 1.15 {RATIO}
LEUKOCYTE EST, POC: NEGATIVE
Lab: NORMAL
Lab: NORMAL
NITRITE UR-MCNC: NEGATIVE MG/ML
PH UR: 6.5 [PH] (ref 5–8)
POTASSIUM SERPL-SCNC: 4.4 MMOL/L (ref 3.5–5.2)
PROT SERPL-MCNC: 7.1 G/DL (ref 6–8.5)
PROT UR STRIP-MCNC: NEGATIVE MG/DL
RBC # UR STRIP: NEGATIVE /UL
SODIUM SERPL-SCNC: 138 MMOL/L (ref 136–145)
SP GR UR: 1.01 (ref 1–1.03)
TRIGL SERPL-MCNC: 40 MG/DL (ref 0–150)
TSH SERPL DL<=0.05 MIU/L-ACNC: 1.08 UIU/ML (ref 0.27–4.2)
UROBILINOGEN UR QL: NORMAL
VLDLC SERPL-MCNC: 8 MG/DL (ref 5–40)

## 2023-03-07 PROCEDURE — 84443 ASSAY THYROID STIM HORMONE: CPT | Performed by: FAMILY MEDICINE

## 2023-03-07 PROCEDURE — 85025 COMPLETE CBC W/AUTO DIFF WBC: CPT | Performed by: FAMILY MEDICINE

## 2023-03-07 PROCEDURE — 99395 PREV VISIT EST AGE 18-39: CPT | Performed by: FAMILY MEDICINE

## 2023-03-07 PROCEDURE — 82306 VITAMIN D 25 HYDROXY: CPT | Performed by: FAMILY MEDICINE

## 2023-03-07 PROCEDURE — 80053 COMPREHEN METABOLIC PANEL: CPT | Performed by: FAMILY MEDICINE

## 2023-03-07 PROCEDURE — 81003 URINALYSIS AUTO W/O SCOPE: CPT | Performed by: FAMILY MEDICINE

## 2023-03-07 PROCEDURE — 36415 COLL VENOUS BLD VENIPUNCTURE: CPT | Performed by: FAMILY MEDICINE

## 2023-03-07 PROCEDURE — 3044F HG A1C LEVEL LT 7.0%: CPT | Performed by: FAMILY MEDICINE

## 2023-03-07 PROCEDURE — 2014F MENTAL STATUS ASSESS: CPT | Performed by: FAMILY MEDICINE

## 2023-03-07 PROCEDURE — 3008F BODY MASS INDEX DOCD: CPT | Performed by: FAMILY MEDICINE

## 2023-03-07 PROCEDURE — 83036 HEMOGLOBIN GLYCOSYLATED A1C: CPT | Performed by: FAMILY MEDICINE

## 2023-03-07 PROCEDURE — 80061 LIPID PANEL: CPT | Performed by: FAMILY MEDICINE

## 2023-03-07 RX ORDER — DAPAGLIFLOZIN 10 MG/1
10 TABLET, FILM COATED ORAL DAILY
Qty: 30 TABLET | Refills: 4 | Status: SHIPPED | OUTPATIENT
Start: 2023-03-07

## 2023-03-07 NOTE — PROGRESS NOTES
Female Physical Note      Date: 2023   Patient Name: Marcela Perales  : 1995   MRN: [id number]     Chief Complaint   Patient presents with   • Annual Exam     Normal pap , next pap    • Diabetes     Been over a year since seen last a1c 10.8       History of Present Illness: Marcela Perales is a 27 y.o. female who is here today for their annual physical exam. States she is doing well and is up to date with most of her vaccinations and preventative medicine exams. She feels she has been doing well otherwise.  Patient states she has been doing intermittent fasting and has lost a fair amount of weight and gotten better control over her blood sugar readings.  She has been off of her prescription diabetic medication for some time and restarted about a month ago and most of her blood sugar readings are below 150.  She works as a hairdresser and lives at home with her children and she does use tobacco in the form of vaping and states she is working on stopping and she is counseled to stop and her last Pap was within the last 3 years and she has regular periods.    Subjective      Review of Systems    Past Medical History, Social History, Family History and Care Team were all reviewed with patient and updated as appropriate.     No Known Allergies      Current Outpatient Medications:   •  ASHWAGANDHA PO, Take  by mouth. For anxiety, taking once a day, Disp: , Rfl:   •  Barberry-Oreg Grape-Goldenseal (BERBERINE COMPLEX PO), Take  by mouth. 1500 mg each meal, Disp: , Rfl:   •  Blood Glucose Monitoring Suppl (ONE TOUCH ULTRA 2) w/Device kit, USE AS DIRECTED TO CHECK FASTING SUGAR DAILY AND AS NEEDED, Disp: , Rfl:   •  dapagliflozin Propanediol (Farxiga) 10 MG tablet, Take 10 mg by mouth Daily., Disp: 30 tablet, Rfl: 4  •  glucose blood test strip, Pt to ck FS daily and PRN.  Dx: E11.9, Disp: 100 each, Rfl: 11  •  glucose monitor monitoring kit, Pt to ck FS daily and PRN.  Dx: E11.9,  Disp: 1 each, Rfl: 0  •  Lancet Device misc, Pt to ck FS daily and PRN. Dx: E11.9, Disp: 100 each, Rfl: 11  •  busPIRone (BUSPAR) 7.5 MG tablet, Take 1 tablet by mouth 2 (Two) Times a Day., Disp: 180 tablet, Rfl: 1  •  propranolol (INDERAL) 10 MG tablet, Take 1 tablet by mouth 3 (Three) Times a Day As Needed (anxiety)., Disp: 270 tablet, Rfl: 1    Health Maintenance Summary          Overdue - Hepatitis B (1 of 3 - 3-dose series) Overdue - never done    No completion, postpone, or frequency change history exists for this topic.          Overdue - URINE MICROALBUMIN (Yearly) Overdue - never done    No completion, postpone, or frequency change history exists for this topic.          Overdue - COVID-19 Vaccine (1) Overdue - never done    No completion, postpone, or frequency change history exists for this topic.          Overdue - Pneumococcal Vaccine 0-64 (1 - PCV) Overdue - never done    No completion, postpone, or frequency change history exists for this topic.          Overdue - TDAP/TD VACCINES (1 - Tdap) Overdue - never done    No completion, postpone, or frequency change history exists for this topic.          Overdue - DIABETIC FOOT EXAM (Yearly) Overdue - never done    No completion, postpone, or frequency change history exists for this topic.          Overdue - DIABETIC EYE EXAM (Yearly) Overdue - never done    No completion, postpone, or frequency change history exists for this topic.          Overdue - INFLUENZA VACCINE (Yearly - August to March) Overdue - never done    11/02/2021  Postponed until 11/2/2022 by Suzanne Kulkarni, APRN (Patient Refused)          HEMOGLOBIN A1C (Every 6 Months) Next due on 9/7/2023 03/07/2023  Hemoglobin A1C component of POC Glycosylated Hemoglobin (Hb A1C)    11/16/2021  Hemoglobin A1C component of POC Glycosylated Hemoglobin (Hb A1C)    01/24/2018  Hemoglobin A1C component of Hemoglobin A1c    10/31/2016  Hemoglobin A1C component of POC Glycated Hemoglobin, Total     "10/13/2016  Hemoglobin A1C component of POC Glycosylated Hemoglobin (Hb A1C)          ANNUAL PHYSICAL (Yearly) Next due on 3/7/2024    03/07/2023  Done    12/06/2021  Done          PAP SMEAR (Every 3 Years) Next due on 12/6/2024 12/06/2021  Liquid-based Pap Smear, Screening    08/01/2018  Patient-Reported (Performed Externally)          HEPATITIS C SCREENING  Completed    01/24/2018  Hepatitis C Antibody                Immunization History   Administered Date(s) Administered   • DTaP, Unspecified 12/30/1997   • HiB 12/30/1997   • MMR 12/30/1997       Social History     Substance and Sexual Activity   Sexual Activity Yes   • Partners: Male   • Birth control/protection: Surgical     Patient's last menstrual period was 03/03/2023 (approximate).    Objective     Physical Exam:  Vitals:    03/07/23 1011   BP: 124/90   Cuff Size: Large Adult   Pulse: 86   Resp: 18   Temp: 98.2 °F (36.8 °C)   TempSrc: Infrared   SpO2: 99%   Weight: 83.9 kg (185 lb)   Height: 165.7 cm (65.25\")   PainSc: 0-No pain     Body mass index is 30.55 kg/m².     Physical Exam  Vitals and nursing note reviewed.   Constitutional:       General: She is awake.   HENT:      Right Ear: Tympanic membrane and ear canal normal.      Left Ear: Tympanic membrane and ear canal normal.      Mouth/Throat:      Mouth: Mucous membranes are moist.      Pharynx: Oropharynx is clear.   Eyes:      Conjunctiva/sclera: Conjunctivae normal.   Neck:      Thyroid: No thyroid mass.   Cardiovascular:      Rate and Rhythm: Normal rate and regular rhythm.      Heart sounds: Normal heart sounds. No murmur heard.    No friction rub. No gallop.   Pulmonary:      Effort: Pulmonary effort is normal.      Breath sounds: Normal breath sounds. No wheezing, rhonchi or rales.   Abdominal:      Palpations: Abdomen is soft. There is no hepatomegaly or splenomegaly.      Tenderness: There is no abdominal tenderness. There is no rebound.   Musculoskeletal:      Cervical back: Normal range " of motion.      Right lower leg: No edema.      Left lower leg: No edema.   Lymphadenopathy:      Cervical: No cervical adenopathy.   Neurological:      General: No focal deficit present.      Mental Status: She is alert and oriented to person, place, and time.         Procedures    Assessment / Plan      Assessment/Plan:   Diagnoses and all orders for this visit:    1. Annual physical exam (Primary)  -     POCT urinalysis dipstick, automated    2. Type 2 diabetes mellitus with hyperglycemia, without long-term current use of insulin (HCC)  -     POC Glycosylated Hemoglobin (Hb A1C) patient now has improved control  3.  CMP, vitamin D, CBC, TSH, lipids    4. rx refills today and discussed her continued intermittent fasting            Follow Up:   Return in about 3 months (around 6/7/2023) for Roel Hess.    Age-appropriate and preventive counseling is given today.    Smiley Sequeira DO

## 2023-03-08 ENCOUNTER — TELEPHONE (OUTPATIENT)
Dept: INTERNAL MEDICINE | Facility: CLINIC | Age: 28
End: 2023-03-08
Payer: COMMERCIAL

## 2023-03-08 LAB
25(OH)D3 SERPL-MCNC: 50.2 NG/ML (ref 30–100)
BASOPHILS # BLD AUTO: 0.03 10*3/MM3 (ref 0–0.2)
BASOPHILS NFR BLD AUTO: 0.5 % (ref 0–1.5)
DEPRECATED RDW RBC AUTO: 38.4 FL (ref 37–54)
EOSINOPHIL # BLD AUTO: 0.04 10*3/MM3 (ref 0–0.4)
EOSINOPHIL NFR BLD AUTO: 0.6 % (ref 0.3–6.2)
ERYTHROCYTE [DISTWIDTH] IN BLOOD BY AUTOMATED COUNT: 12.6 % (ref 12.3–15.4)
HCT VFR BLD AUTO: 40.1 % (ref 34–46.6)
HGB BLD-MCNC: 12.5 G/DL (ref 12–15.9)
IMM GRANULOCYTES # BLD AUTO: 0.02 10*3/MM3 (ref 0–0.05)
IMM GRANULOCYTES NFR BLD AUTO: 0.3 % (ref 0–0.5)
LYMPHOCYTES # BLD AUTO: 2.68 10*3/MM3 (ref 0.7–3.1)
LYMPHOCYTES NFR BLD AUTO: 43.2 % (ref 19.6–45.3)
MCH RBC QN AUTO: 26.6 PG (ref 26.6–33)
MCHC RBC AUTO-ENTMCNC: 31.2 G/DL (ref 31.5–35.7)
MCV RBC AUTO: 85.3 FL (ref 79–97)
MONOCYTES # BLD AUTO: 0.46 10*3/MM3 (ref 0.1–0.9)
MONOCYTES NFR BLD AUTO: 7.4 % (ref 5–12)
NEUTROPHILS NFR BLD AUTO: 2.97 10*3/MM3 (ref 1.7–7)
NEUTROPHILS NFR BLD AUTO: 48 % (ref 42.7–76)
NRBC BLD AUTO-RTO: 0 /100 WBC (ref 0–0.2)
PLATELET # BLD AUTO: 365 10*3/MM3 (ref 140–450)
PMV BLD AUTO: 11 FL (ref 6–12)
RBC # BLD AUTO: 4.7 10*6/MM3 (ref 3.77–5.28)
WBC NRBC COR # BLD: 6.2 10*3/MM3 (ref 3.4–10.8)

## 2023-03-08 NOTE — TELEPHONE ENCOUNTER
Pn per JORGE lft vm     ----- Message from Smiley Sequeira DO sent at 3/8/2023  3:30 PM EST -----  Labs are good and cholesterol is much improved---great job!!

## 2023-03-09 ENCOUNTER — TELEPHONE (OUTPATIENT)
Dept: INTERNAL MEDICINE | Facility: CLINIC | Age: 28
End: 2023-03-09
Payer: COMMERCIAL

## 2023-03-10 NOTE — TELEPHONE ENCOUNTER
LVM advising pt to make an appt for a letter to be sent in. We need documentation for the reason she is needing the dog in order for a  letter to be made.

## 2023-03-10 NOTE — TELEPHONE ENCOUNTER
She was seen on 3/7/2023 for an annual exam. I do not see anything for anxiety / depression or regarding a support dog? Does she need to make another appt to discuss this or do you feel comfortable to write letter?

## 2023-03-23 ENCOUNTER — PRIOR AUTHORIZATION (OUTPATIENT)
Dept: INTERNAL MEDICINE | Facility: CLINIC | Age: 28
End: 2023-03-23
Payer: COMMERCIAL

## 2023-03-23 NOTE — TELEPHONE ENCOUNTER
This has been approved    Approvedtoday  The request has been approved. The authorization is effective for a maximum of 12 fills from 03/23/2023 to 03/22/2024, as long as the member is enrolled in their current health plan. The request was approved with a quantity restriction. This has been approved for a max daily dosage of 1. A written notification letter will follow with additional details.    Faxed to pharmacy

## 2023-05-09 ENCOUNTER — OFFICE VISIT (OUTPATIENT)
Dept: INTERNAL MEDICINE | Facility: CLINIC | Age: 28
End: 2023-05-09
Payer: COMMERCIAL

## 2023-05-09 VITALS
OXYGEN SATURATION: 99 % | WEIGHT: 189.2 LBS | SYSTOLIC BLOOD PRESSURE: 138 MMHG | HEART RATE: 97 BPM | RESPIRATION RATE: 20 BRPM | HEIGHT: 65 IN | BODY MASS INDEX: 31.52 KG/M2 | TEMPERATURE: 98 F | DIASTOLIC BLOOD PRESSURE: 96 MMHG

## 2023-05-09 DIAGNOSIS — F41.1 GAD (GENERALIZED ANXIETY DISORDER): Primary | ICD-10-CM

## 2023-05-09 DIAGNOSIS — I10 ELEVATED BLOOD PRESSURE READING IN OFFICE WITH DIAGNOSIS OF HYPERTENSION: ICD-10-CM

## 2023-05-09 PROCEDURE — 1160F RVW MEDS BY RX/DR IN RCRD: CPT | Performed by: FAMILY MEDICINE

## 2023-05-09 PROCEDURE — 3080F DIAST BP >= 90 MM HG: CPT | Performed by: FAMILY MEDICINE

## 2023-05-09 PROCEDURE — 3044F HG A1C LEVEL LT 7.0%: CPT | Performed by: FAMILY MEDICINE

## 2023-05-09 PROCEDURE — 99213 OFFICE O/P EST LOW 20 MIN: CPT | Performed by: FAMILY MEDICINE

## 2023-05-09 PROCEDURE — 1159F MED LIST DOCD IN RCRD: CPT | Performed by: FAMILY MEDICINE

## 2023-05-09 PROCEDURE — 3075F SYST BP GE 130 - 139MM HG: CPT | Performed by: FAMILY MEDICINE

## 2023-05-09 NOTE — PROGRESS NOTES
"    Office Note     Name: Marcela Perales    : 1995     MRN: [id number]     Chief Complaint  animal support letter (For anxiety and stress, doesn't take the buspar any more only natural supplement ashwagandha)    Subjective     History of Present Illness:  Marcela Perales is a 28 y.o. female who presents today for recheck of several issues.  She states that her blood pressure was a bit elevated here today and she has some questions about that and she does have a family history of hypertension.  She also states that she would like a letter for an emotional support animal for her dog to be able to live with her and she had some side effects from BuSpar and has stopped taking it but she feels her anxiety is stable with a natural supplement.  She does not have a personal history of hypertension that she is aware of but it has been elevated the last couple of times in a doctor's office.  She has been seeing a psychiatrist and a therapist both in the past.    Review of Systems   Respiratory: Negative for cough, shortness of breath and wheezing.    Cardiovascular: Negative for chest pain and palpitations.   Gastrointestinal: Negative for blood in stool, diarrhea and vomiting.   Genitourinary: Negative for dysuria, flank pain and genital sores.       Objective     Vital Signs  /96 (Cuff Size: Large Adult)   Pulse 97   Temp 98 °F (36.7 °C) (Infrared)   Resp 20   Ht 165.7 cm (65.24\")   Wt 85.8 kg (189 lb 3.2 oz)   SpO2 99%   BMI 31.26 kg/m²   Estimated body mass index is 31.26 kg/m² as calculated from the following:    Height as of this encounter: 165.7 cm (65.24\").    Weight as of this encounter: 85.8 kg (189 lb 3.2 oz).          Physical Exam  Vitals and nursing note reviewed.   HENT:      Head: Normocephalic and atraumatic.   Neck:      Vascular: No carotid bruit.   Cardiovascular:      Rate and Rhythm: Normal rate and regular rhythm.      Heart sounds: Normal heart sounds. No murmur " heard.    No gallop.   Pulmonary:      Effort: Pulmonary effort is normal. No respiratory distress.      Breath sounds: No stridor. No wheezing, rhonchi or rales.   Musculoskeletal:      Cervical back: Normal range of motion and neck supple.      Right lower leg: No edema.      Left lower leg: No edema.   Lymphadenopathy:      Cervical: No cervical adenopathy.   Neurological:      Mental Status: She is alert.                 Assessment and Plan     Diagnoses and all orders for this visit:    1. MARKUS (generalized anxiety disorder) (Primary)-stable    2. Elevated blood pressure reading in office with diagnosis of hypertension-monitor at home    3.  Behavioral health referral for letter for an emotional support animal      Follow Up  Return if symptoms worsen or fail to improve, for MEHREEN Kulkarni.    Smiley Sequeira, DO

## 2023-06-06 ENCOUNTER — OFFICE VISIT (OUTPATIENT)
Dept: INTERNAL MEDICINE | Facility: CLINIC | Age: 28
End: 2023-06-06
Payer: COMMERCIAL

## 2023-06-06 VITALS
WEIGHT: 191.2 LBS | SYSTOLIC BLOOD PRESSURE: 126 MMHG | BODY MASS INDEX: 31.86 KG/M2 | HEIGHT: 65 IN | OXYGEN SATURATION: 100 % | DIASTOLIC BLOOD PRESSURE: 90 MMHG | TEMPERATURE: 98.4 F | RESPIRATION RATE: 18 BRPM | HEART RATE: 98 BPM

## 2023-06-06 DIAGNOSIS — E11.65 TYPE 2 DIABETES MELLITUS WITH HYPERGLYCEMIA, WITHOUT LONG-TERM CURRENT USE OF INSULIN: Primary | ICD-10-CM

## 2023-06-06 DIAGNOSIS — E66.9 CLASS 1 OBESITY WITHOUT SERIOUS COMORBIDITY WITH BODY MASS INDEX (BMI) OF 31.0 TO 31.9 IN ADULT, UNSPECIFIED OBESITY TYPE: ICD-10-CM

## 2023-06-06 PROBLEM — E66.01 MORBID OBESITY WITH BODY MASS INDEX (BMI) OF 40.0 TO 44.9 IN ADULT: Status: RESOLVED | Noted: 2018-04-24 | Resolved: 2023-06-06

## 2023-06-06 PROBLEM — E66.811 CLASS 1 OBESITY WITHOUT SERIOUS COMORBIDITY WITH BODY MASS INDEX (BMI) OF 31.0 TO 31.9 IN ADULT: Status: ACTIVE | Noted: 2023-06-06

## 2023-06-06 LAB
EXPIRATION DATE: NORMAL
HBA1C MFR BLD: 6.1 %
Lab: NORMAL

## 2023-06-06 RX ORDER — PROPRANOLOL HYDROCHLORIDE 10 MG/1
10 TABLET ORAL
COMMUNITY
Start: 2023-06-01

## 2023-06-06 RX ORDER — PHENTERMINE HYDROCHLORIDE 30 MG/1
30 CAPSULE ORAL EVERY MORNING
COMMUNITY

## 2023-06-06 NOTE — PROGRESS NOTES
"    Office Note     Name: Marcela Perales    : 1995     MRN: [id number]     Chief Complaint  Diabetes (3 month f/u)    Subjective     History of Present Illness:  Marcela Perales is a 28 y.o. female who presents today for recheck of several issues.  Her hemoglobin A1c today is 6.1.  She has been doing intermittent fasting and watching her diet very carefully and being very selective with what she eats and with her fasting and she is not taking any of her diabetic medicines currently and controlling it only with diet and exercise and she is exercising regularly and drinking plenty of water.  She has been able to control her diabetes this way for several months and she is consistently losing weight as well.    Review of Systems   Respiratory:  Negative for cough, shortness of breath and wheezing.    Cardiovascular:  Negative for chest pain and palpitations.   Gastrointestinal:  Negative for blood in stool, diarrhea and vomiting.   Genitourinary:  Negative for dysuria, flank pain and genital sores.     Objective     Vital Signs  /90 (Cuff Size: Adult)   Pulse 98   Temp 98.4 °F (36.9 °C) (Infrared)   Resp 18   Ht 165.7 cm (65.25\")   Wt 86.7 kg (191 lb 3.2 oz)   SpO2 100%   BMI 31.57 kg/m²   Estimated body mass index is 31.57 kg/m² as calculated from the following:    Height as of this encounter: 165.7 cm (65.25\").    Weight as of this encounter: 86.7 kg (191 lb 3.2 oz).          Physical Exam  Vitals and nursing note reviewed.   HENT:      Head: Normocephalic and atraumatic.   Neck:      Vascular: No carotid bruit.   Cardiovascular:      Rate and Rhythm: Normal rate and regular rhythm.      Heart sounds: Normal heart sounds. No murmur heard.    No gallop.   Pulmonary:      Effort: Pulmonary effort is normal. No respiratory distress.      Breath sounds: No stridor. No wheezing, rhonchi or rales.   Musculoskeletal:      Cervical back: Normal range of motion and neck supple.      Right " lower leg: No edema.      Left lower leg: No edema.   Lymphadenopathy:      Cervical: No cervical adenopathy.   Neurological:      Mental Status: She is alert.               Assessment and Plan     Diagnoses and all orders for this visit:    1. Type 2 diabetes mellitus with hyperglycemia, without long-term current use of insulin (Primary)---diet controlled  -     POC Glycosylated Hemoglobin (Hb A1C)    2. Class 1 obesity without serious comorbidity with body mass index (BMI) of 31.0 to 31.9 in adult, unspecified obesity type--losing weight with intermittent fasting          Follow Up  Return in about 3 months (around 9/6/2023) for Recheck.    Smiley Sequeira, DO

## 2023-12-27 NOTE — TELEPHONE ENCOUNTER
Caller: Marcela Perales    Relationship: Self    Best call back number:[phone]    What is the best time to reach you: ANYTIME    Who are you requesting to speak with (clinical staff, provider,  specific staff member): PCP/MA      What was the call regarding: TO DISCUSS A LETTER FOR A SUPPORT ANIMAL    Do you require a callback: YES           Negative

## 2024-10-22 ENCOUNTER — OFFICE VISIT (OUTPATIENT)
Dept: INTERNAL MEDICINE | Facility: CLINIC | Age: 29
End: 2024-10-22
Payer: COMMERCIAL

## 2024-10-22 VITALS
RESPIRATION RATE: 16 BRPM | TEMPERATURE: 97.8 F | DIASTOLIC BLOOD PRESSURE: 84 MMHG | OXYGEN SATURATION: 98 % | SYSTOLIC BLOOD PRESSURE: 136 MMHG | BODY MASS INDEX: 30.39 KG/M2 | HEART RATE: 86 BPM | WEIGHT: 182.4 LBS | HEIGHT: 65 IN

## 2024-10-22 DIAGNOSIS — F41.1 GAD (GENERALIZED ANXIETY DISORDER): ICD-10-CM

## 2024-10-22 DIAGNOSIS — R73.03 PREDIABETES: ICD-10-CM

## 2024-10-22 DIAGNOSIS — F51.01 PRIMARY INSOMNIA: ICD-10-CM

## 2024-10-22 DIAGNOSIS — Z00.00 ENCOUNTER FOR ANNUAL PHYSICAL EXAM: Primary | ICD-10-CM

## 2024-10-22 LAB
EXPIRATION DATE: ABNORMAL
HBA1C MFR BLD: 6.3 % (ref 4.5–5.7)
Lab: ABNORMAL

## 2024-10-22 PROCEDURE — 99213 OFFICE O/P EST LOW 20 MIN: CPT

## 2024-10-22 PROCEDURE — 3079F DIAST BP 80-89 MM HG: CPT

## 2024-10-22 PROCEDURE — 83036 HEMOGLOBIN GLYCOSYLATED A1C: CPT

## 2024-10-22 PROCEDURE — 99395 PREV VISIT EST AGE 18-39: CPT

## 2024-10-22 PROCEDURE — 1126F AMNT PAIN NOTED NONE PRSNT: CPT

## 2024-10-22 PROCEDURE — 3075F SYST BP GE 130 - 139MM HG: CPT

## 2024-10-22 PROCEDURE — 1159F MED LIST DOCD IN RCRD: CPT

## 2024-10-22 PROCEDURE — 1160F RVW MEDS BY RX/DR IN RCRD: CPT

## 2024-10-22 PROCEDURE — 2014F MENTAL STATUS ASSESS: CPT

## 2024-10-22 PROCEDURE — 3044F HG A1C LEVEL LT 7.0%: CPT

## 2024-10-22 RX ORDER — TRAZODONE HYDROCHLORIDE 50 MG/1
50 TABLET, FILM COATED ORAL NIGHTLY
Qty: 90 TABLET | Refills: 1 | Status: SHIPPED | OUTPATIENT
Start: 2024-10-22

## 2024-10-22 NOTE — PROGRESS NOTES
"    Annual Physical Exam     Name: Marcela Perales     : 1995     MRN: [id number]    Chief Complaint  Annual Exam    Subjective     Subjective          History of Present Illness    Marcela Perales presents to [facility] for a routine physical.  History of Present Illness     Here for annual exam.     States her  past away in April and she has not been feeling right since. He committed suicide while she was present. She has been going to therapy weekly. States she does not take any other medications at this time. Has propranolol to take prn. She does have a hx of suidical ideation and has been hospitalized for this. She denies any recent SI, last time was about 1 month ago     Diet/exercise: diet has not been great, states she has been eating whatever, eats pizza. States sometimes she does not have an appetite. States her alcohol intake has increased. Primarily drinks on the weekends, unsure of how much she drinks but says \"its a lot\".  Eye exams: denies any vision changes, does not wear glasses or contacts  Dental exams: she does see dentist 2x/year for routine cleaning   Sleep: sleep is not great. States she has a hard time staying asleep. Wakes up frequently throughout the night. She has tried melatonin in the past but states it didn't work well for her      PHQ-9 Depression Screening  PHQ-2/PHQ-9: Depression Screening  Little interest or pleasure in doing things: More than half the days  Feeling down, depressed, or hopeless: More than half the days  Patient Health Questionnaire-2 Score: 4  Trouble falling or staying asleep, or sleeping too much: More than half the days  Feeling tired or having little energy: More than half the days  Poor appetite or overeating: More than half the days  Feeling bad about yourself - or that you are a failure or have let yourself or your family down: More than half the days  Trouble concentrating on things, such as reading " the newspaper or watching television: Several days  Moving or speaking so slowly that other people could have noticed? Or the opposite - being so fidgety or restless that you have been moving around a lot more than usual.: Several days  Thoughts that you would be better off dead or hurting yourself in some way: Not at all  Patient Health Questionnaire-9 Score: 14  How difficult have these problems made it for you to do your work, take care of things at home, or get along with other people?: Somewhat difficult      MARKUS-7 Screening  Over the last two weeks, how often have you been bothered by the following problems?  Feeling nervous, anxious or on edge: More than half the days  Not being able to stop or control worrying: More than half the days  Worrying too much about different things: More than half the days  Trouble Relaxing: More than half the days  Being so restless that it is hard to sit still: More than half the days  Becoming easily annoyed or irritable: More than half the days  Feeling afraid as if something awful might happen: Several days  MARKUS 7 Total Score: 13  If you checked any problems, how difficult have these problems made it for you to do your work, take care of things at home, or get along with other people: Somewhat difficult      Patient's last menstrual period was 10/11/2024 (exact date).   reports being sexually active and has had partner(s) who are male. She reports using the following method of birth control/protection: Surgical.  Cancer-related family history is negative for Breast cancer, Ovarian cancer, Uterine cancer, and Colon cancer.      Pap- 10/06/2021  Flu vaccine- declined  Covid vaccine- declinded  Tdap- declined     reports that she quit smoking about 8 years ago. Her smoking use included cigarettes. She has never used smokeless tobacco.     Health Maintenance   Topic Date Due    URINE MICROALBUMIN  Never done    DIABETIC EYE EXAM  Never done    Hepatitis B (1 of 3 - 19+ 3-dose  series) Never done    DIABETIC FOOT EXAM  Never done    COVID-19 Vaccine (1 - -24 season) 10/24/2024 (Originally 2024)    INFLUENZA VACCINE  2025 (Originally 2024)    Pneumococcal Vaccine 0-64 (1 of 2 - PCV) 10/22/2025 (Originally 3/16/2001)    TDAP/TD VACCINES (1 - Tdap) 10/22/2025 (Originally 3/16/2014)    PAP SMEAR  2024    HEMOGLOBIN A1C  2025    BMI FOLLOWUP  10/22/2025    ANNUAL PHYSICAL  10/23/2025    HEPATITIS C SCREENING  Completed       Immunization History   Administered Date(s) Administered    DTaP, Unspecified 1997    HiB 1997    MMR 1997       The following portions of the patient's history were reviewed and updated as appropriate: allergies, current medications, past family history, past medical history, past social history, past surgical history and problem list.    Patient Active Problem List   Diagnosis    Gestational diabetes    Encounter for sterilization    Postpartum care following  delivery     labor in third trimester with  delivery    MARKUS (generalized anxiety disorder)    Elevated blood pressure reading in office with diagnosis of hypertension    Type 2 diabetes mellitus with hyperglycemia, without long-term current use of insulin    Class 1 obesity without serious comorbidity with body mass index (BMI) of 31.0 to 31.9 in adult     No Known Allergies    Current Outpatient Medications:     ASHWAGANDHA PO, Take  by mouth. For anxiety, taking once a day, Disp: , Rfl:     propranolol (INDERAL) 10 MG tablet, Take 1 tablet by mouth Daily As Needed (for anxiety)., Disp: 90 tablet, Rfl: 0    traZODone (DESYREL) 50 MG tablet, Take 1 tablet by mouth Every Night., Disp: 90 tablet, Rfl: 1  New Medications Ordered This Visit   Medications    traZODone (DESYREL) 50 MG tablet     Sig: Take 1 tablet by mouth Every Night.     Dispense:  90 tablet     Refill:  1    propranolol (INDERAL) 10 MG tablet     Sig: Take 1 tablet by mouth Daily As  Needed (for anxiety).     Dispense:  90 tablet     Refill:  0     Past Medical History:   Diagnosis Date    Acid reflux     Anxiety     Gestational diabetes     diet controlled    Polyhydramnios     PTSD (post-traumatic stress disorder)       Past Surgical History:   Procedure Laterality Date     SECTION PRIMARY  2018     SECTION WITH TUBAL N/A 2018    Procedure:  SECTION REPEAT WITH TUBAL;  Surgeon: Juan Avilez MD;  Location: [facility] DELIVERY;  Service: Obstetrics/Gynecology    TUBAL ABDOMINAL LIGATION      WISDOM TOOTH EXTRACTION        Family History   Problem Relation Age of Onset    Diabetes Mother     Hypertension Mother     Diabetes Maternal Aunt     Diabetes Maternal Uncle     Diabetes Maternal Grandmother     Heart failure Maternal Grandmother     Hypertension Father     Other Sister         pre-diabetes    No Known Problems Brother     Dementia Paternal Grandmother     No Known Problems Paternal Grandfather     No Known Problems Son     No Known Problems Daughter     Breast cancer Neg Hx     Ovarian cancer Neg Hx     Uterine cancer Neg Hx     Colon cancer Neg Hx       Social History     Socioeconomic History    Marital status: Legally    Tobacco Use    Smoking status: Former     Current packs/day: 0.00     Types: Cigarettes     Quit date: 3/16/2016     Years since quittin.6    Smokeless tobacco: Never   Vaping Use    Vaping status: Never Used   Substance and Sexual Activity    Alcohol use: Yes     Comment: socially    Drug use: Not Currently     Types: Marijuana    Sexual activity: Yes     Partners: Male     Birth control/protection: Surgical        Objective   Objective   Vital Signs:   Vitals:    10/22/24 1608   BP: 136/84  Comment: Just finished a 8 oz coffee   BP Location: Right arm   Patient Position: Sitting   Cuff Size: Adult   Pulse: 86   Resp: 16   Temp: 97.8 °F (36.6 °C)   TempSrc: Infrared   SpO2: 98%   Weight: 82.7 kg (182 lb  "6.4 oz)   Height: 165.7 cm (65.24\")      Body mass index is 30.13 kg/m².  BMI is >= 30 and <35. (Class 1 Obesity). The following options were offered after discussion;: information on healthy weight added to patient's after visit summary     Physical Exam  Constitutional:       Appearance: Normal appearance. She is not ill-appearing.   HENT:      Right Ear: Tympanic membrane, ear canal and external ear normal. There is no impacted cerumen.      Left Ear: Tympanic membrane, ear canal and external ear normal. There is no impacted cerumen.      Nose: Nose normal. No congestion or rhinorrhea.      Mouth/Throat:      Mouth: Mucous membranes are moist.      Pharynx: Oropharynx is clear. No oropharyngeal exudate or posterior oropharyngeal erythema.   Eyes:      Extraocular Movements: Extraocular movements intact.      Conjunctiva/sclera: Conjunctivae normal.      Pupils: Pupils are equal, round, and reactive to light.   Cardiovascular:      Rate and Rhythm: Normal rate and regular rhythm.      Pulses: Normal pulses.   Pulmonary:      Effort: Pulmonary effort is normal. No respiratory distress.      Breath sounds: Normal breath sounds. No wheezing or rhonchi.   Abdominal:      General: Abdomen is flat. Bowel sounds are normal.      Palpations: Abdomen is soft.      Tenderness: There is no abdominal tenderness.   Musculoskeletal:         General: Normal range of motion.   Skin:     General: Skin is warm.   Neurological:      General: No focal deficit present.      Mental Status: She is alert and oriented to person, place, and time. Mental status is at baseline.   Psychiatric:         Mood and Affect: Mood normal. Affect is flat.         Speech: Speech normal.         Behavior: Behavior normal. Behavior is cooperative.         Thought Content: Thought content does not include suicidal ideation.        Physical Exam      Results for orders placed or performed in visit on 10/22/24   POC Glycosylated Hemoglobin (Hb A1C)    " Collection Time: 10/22/24  4:33 PM    Specimen: Blood   Result Value Ref Range    Hemoglobin A1C 6.3 (A) 4.5 - 5.7 %    Lot Number [id number]     Expiration Date 07-        Results  Patient has been erroneously marked as diabetic. Based on the available clinical information, she does not have diabetes and should therefore be excluded from diabetic health maintenance and quality measures for the remainder of the reporting period.      Assessment and Plan        Assessment and Plan    Diagnoses and all orders for this visit:    1. Encounter for annual physical exam (Primary)  -     Comprehensive Metabolic Panel; Future  -     CBC No Differential; Future  -     Lipid panel; Future  -     TSH Rfx On Abnormal To Free T4; Future  -     Vitamin D 25 hydroxy; Future  -     Vitamin B12; Future    2. Prediabetes  -     Comprehensive Metabolic Panel; Future  -     CBC No Differential; Future  -     Lipid panel; Future  -     TSH Rfx On Abnormal To Free T4; Future  -     POC Glycosylated Hemoglobin (Hb A1C)    3. Primary insomnia  -     traZODone (DESYREL) 50 MG tablet; Take 1 tablet by mouth Every Night.  Dispense: 90 tablet; Refill: 1    4. MARKUS (generalized anxiety disorder)  -     propranolol (INDERAL) 10 MG tablet; Take 1 tablet by mouth Daily As Needed (for anxiety).  Dispense: 90 tablet; Refill: 0    POC a2c-6.3%-pt is to work on healthy diet, exercise and weight loss and we will recheck in 3 months  Labs ordered, will RTC for fasting labs  Begin taking trazodone at night to help with sleep  Propranolol refills sent  Declined all vaccinations  UTD on pap  Assessment & Plan        Follow Up   Return in about 3 months (around 1/22/2025) for a1c check.    Counseled on health maintenance topics and preventative care recommendations. Follow up yearly for routine physical exam. Diet and exercise counseling given. See dentist and eye doctor yearly as directed.    If a referral was made please contact our office if you  have not heard about an appointment in the next 2 weeks.   If labs or images are ordered we will contact you with the results within the next week.  If you have not heard from us after a week please call our office to inquire about the results.    MAYDA Merlos    Patient was given instructions and counseling regarding her condition or for health maintenance advice. Please see specific information pulled into the AVS if appropriate.     * Please note that portions of this note were completed with a voice recognition program.

## 2024-10-23 RX ORDER — PROPRANOLOL HCL 10 MG
10 TABLET ORAL DAILY PRN
Qty: 90 TABLET | Refills: 0 | Status: SHIPPED | OUTPATIENT
Start: 2024-10-23

## 2024-12-12 ENCOUNTER — LAB (OUTPATIENT)
Dept: INTERNAL MEDICINE | Facility: CLINIC | Age: 29
End: 2024-12-12
Payer: COMMERCIAL

## 2024-12-12 DIAGNOSIS — Z00.00 ENCOUNTER FOR ANNUAL PHYSICAL EXAM: ICD-10-CM

## 2024-12-12 DIAGNOSIS — E55.9 VITAMIN D DEFICIENCY: Primary | ICD-10-CM

## 2024-12-12 DIAGNOSIS — R73.03 PREDIABETES: ICD-10-CM

## 2024-12-12 LAB
25(OH)D3 SERPL-MCNC: 20.8 NG/ML (ref 30–100)
ALBUMIN SERPL-MCNC: 3.9 G/DL (ref 3.5–5.2)
ALBUMIN/GLOB SERPL: 1.2 G/DL
ALP SERPL-CCNC: 71 U/L (ref 39–117)
ALT SERPL W P-5'-P-CCNC: 13 U/L (ref 1–33)
ANION GAP SERPL CALCULATED.3IONS-SCNC: 11.8 MMOL/L (ref 5–15)
AST SERPL-CCNC: 14 U/L (ref 1–32)
BILIRUB SERPL-MCNC: <0.2 MG/DL (ref 0–1.2)
BUN SERPL-MCNC: 8 MG/DL (ref 6–20)
BUN/CREAT SERPL: 9.5 (ref 7–25)
CALCIUM SPEC-SCNC: 9.3 MG/DL (ref 8.6–10.5)
CHLORIDE SERPL-SCNC: 102 MMOL/L (ref 98–107)
CHOLEST SERPL-MCNC: 159 MG/DL (ref 0–200)
CO2 SERPL-SCNC: 21.2 MMOL/L (ref 22–29)
CREAT SERPL-MCNC: 0.84 MG/DL (ref 0.57–1)
DEPRECATED RDW RBC AUTO: 38 FL (ref 37–54)
EGFRCR SERPLBLD CKD-EPI 2021: 96.6 ML/MIN/1.73
ERYTHROCYTE [DISTWIDTH] IN BLOOD BY AUTOMATED COUNT: 12.9 % (ref 12.3–15.4)
GLOBULIN UR ELPH-MCNC: 3.3 GM/DL
GLUCOSE SERPL-MCNC: 128 MG/DL (ref 65–99)
HCT VFR BLD AUTO: 36.8 % (ref 34–46.6)
HDLC SERPL-MCNC: 87 MG/DL (ref 40–60)
HGB BLD-MCNC: 12 G/DL (ref 12–15.9)
LDLC SERPL CALC-MCNC: 63 MG/DL (ref 0–100)
LDLC/HDLC SERPL: 0.74 {RATIO}
MCH RBC QN AUTO: 26.8 PG (ref 26.6–33)
MCHC RBC AUTO-ENTMCNC: 32.6 G/DL (ref 31.5–35.7)
MCV RBC AUTO: 82.1 FL (ref 79–97)
PLATELET # BLD AUTO: 323 10*3/MM3 (ref 140–450)
PMV BLD AUTO: 11.2 FL (ref 6–12)
POTASSIUM SERPL-SCNC: 4.3 MMOL/L (ref 3.5–5.2)
PROT SERPL-MCNC: 7.2 G/DL (ref 6–8.5)
RBC # BLD AUTO: 4.48 10*6/MM3 (ref 3.77–5.28)
SODIUM SERPL-SCNC: 135 MMOL/L (ref 136–145)
TRIGL SERPL-MCNC: 36 MG/DL (ref 0–150)
TSH SERPL DL<=0.05 MIU/L-ACNC: 0.68 UIU/ML (ref 0.27–4.2)
VIT B12 BLD-MCNC: 948 PG/ML (ref 211–946)
VLDLC SERPL-MCNC: 9 MG/DL (ref 5–40)
WBC NRBC COR # BLD AUTO: 4.58 10*3/MM3 (ref 3.4–10.8)

## 2024-12-12 PROCEDURE — 85027 COMPLETE CBC AUTOMATED: CPT

## 2024-12-12 PROCEDURE — 82607 VITAMIN B-12: CPT

## 2024-12-12 PROCEDURE — 80053 COMPREHEN METABOLIC PANEL: CPT

## 2024-12-12 PROCEDURE — 80061 LIPID PANEL: CPT

## 2024-12-12 PROCEDURE — 84443 ASSAY THYROID STIM HORMONE: CPT

## 2024-12-12 PROCEDURE — 36415 COLL VENOUS BLD VENIPUNCTURE: CPT

## 2024-12-12 PROCEDURE — 82306 VITAMIN D 25 HYDROXY: CPT

## 2024-12-12 RX ORDER — ERGOCALCIFEROL 1.25 MG/1
50000 CAPSULE, LIQUID FILLED ORAL WEEKLY
Qty: 8 CAPSULE | Refills: 0 | Status: SHIPPED | OUTPATIENT
Start: 2024-12-12

## 2024-12-23 ENCOUNTER — TELEPHONE (OUTPATIENT)
Dept: INTERNAL MEDICINE | Facility: CLINIC | Age: 29
End: 2024-12-23
Payer: COMMERCIAL

## 2024-12-23 NOTE — TELEPHONE ENCOUNTER
Caller: Marcela Perales    Relationship: Self    Best call back number: [phone]     What was the call regarding: PATIENT DOES NOT WANT TO SEE DR HOLM, WOULD LIKE TO SEE [facility] HALEY WOULD LIKE TO BE SCHEDULED WITH HER. PLEASE ADVISE.     Is it okay if the provider responds through MyChart: YES

## 2025-01-21 DIAGNOSIS — F41.1 GAD (GENERALIZED ANXIETY DISORDER): ICD-10-CM

## 2025-01-23 RX ORDER — PROPRANOLOL HYDROCHLORIDE 10 MG/1
10 TABLET ORAL DAILY PRN
Qty: 30 TABLET | Refills: 0 | Status: SHIPPED | OUTPATIENT
Start: 2025-01-23

## 2025-01-29 ENCOUNTER — TELEPHONE (OUTPATIENT)
Dept: OBSTETRICS AND GYNECOLOGY | Facility: CLINIC | Age: 30
End: 2025-01-29
Payer: COMMERCIAL

## 2025-01-29 NOTE — TELEPHONE ENCOUNTER
Hub staff attempted to follow warm transfer process and was unsuccessful     Caller: Marcela Perales    Relationship to patient: Self    Best call back number: [phone]     Patient is needing: PATIENT STATES SHE RECEIVED A MESSAGE TO CALL OFFICE TO RESCHEDULE HER FEBRUARY 6 APPOINTMENT SHE HAD SCHEDULED WITH DR. PAM PANDEY.  PATIENT CALLED AND THE FIRST AVAILABLE IS NOT UNTIL MAY 6 WITH DR. MONAE.  PATIENT HAS BEEN MOVED ON SCHEDULE BUT WOULD LIKE SOMEONE TO CALL HER REGARDING SEEING IF SHE CAN BE SEEN SOONER.

## 2025-01-30 ENCOUNTER — OFFICE VISIT (OUTPATIENT)
Dept: INTERNAL MEDICINE | Facility: CLINIC | Age: 30
End: 2025-01-30
Payer: COMMERCIAL

## 2025-01-30 VITALS
WEIGHT: 186.6 LBS | HEIGHT: 65 IN | DIASTOLIC BLOOD PRESSURE: 68 MMHG | OXYGEN SATURATION: 97 % | BODY MASS INDEX: 31.09 KG/M2 | RESPIRATION RATE: 18 BRPM | TEMPERATURE: 97.5 F | SYSTOLIC BLOOD PRESSURE: 106 MMHG | HEART RATE: 90 BPM

## 2025-01-30 DIAGNOSIS — R21 RASH: ICD-10-CM

## 2025-01-30 DIAGNOSIS — R73.03 PREDIABETES: Primary | ICD-10-CM

## 2025-01-30 DIAGNOSIS — F51.01 PRIMARY INSOMNIA: ICD-10-CM

## 2025-01-30 DIAGNOSIS — Z86.39 HISTORY OF TYPE 2 DIABETES MELLITUS: ICD-10-CM

## 2025-01-30 DIAGNOSIS — F41.1 GAD (GENERALIZED ANXIETY DISORDER): ICD-10-CM

## 2025-01-30 LAB
EXPIRATION DATE: ABNORMAL
HBA1C MFR BLD: 6.4 % (ref 4.5–5.7)
Lab: ABNORMAL

## 2025-01-30 PROCEDURE — 3044F HG A1C LEVEL LT 7.0%: CPT

## 2025-01-30 PROCEDURE — 1159F MED LIST DOCD IN RCRD: CPT

## 2025-01-30 PROCEDURE — 83036 HEMOGLOBIN GLYCOSYLATED A1C: CPT

## 2025-01-30 PROCEDURE — 99214 OFFICE O/P EST MOD 30 MIN: CPT

## 2025-01-30 PROCEDURE — 3074F SYST BP LT 130 MM HG: CPT

## 2025-01-30 PROCEDURE — 1160F RVW MEDS BY RX/DR IN RCRD: CPT

## 2025-01-30 PROCEDURE — 1126F AMNT PAIN NOTED NONE PRSNT: CPT

## 2025-01-30 PROCEDURE — 3078F DIAST BP <80 MM HG: CPT

## 2025-01-30 RX ORDER — TRIAMCINOLONE ACETONIDE 1 MG/G
1 OINTMENT TOPICAL 2 TIMES DAILY
Qty: 80 G | Refills: 1 | Status: SHIPPED | OUTPATIENT
Start: 2025-01-30

## 2025-01-30 RX ORDER — DAPAGLIFLOZIN 10 MG/1
10 TABLET, FILM COATED ORAL DAILY
Qty: 30 TABLET | Refills: 5 | Status: SHIPPED | OUTPATIENT
Start: 2025-01-30

## 2025-01-30 NOTE — PROGRESS NOTES
Office Note     Name: Mracela Perales    : 1995     MRN: [id number]     Chief Complaint  Anxiety, Insomnia, Prediabetes, and Rash (On both elbows /)    Subjective     History of Present Illness:  History of Present Illness      Marcela Perales is a 29 y.o. female who presents today for   3-month follow-up on prediabetes, anxiety and insomnia.    Prediabetes- last A1c-6.3%, states she has been working on diet and started exercising some.  In  she was diagnosed with T2DM, A1c was 10.8%. she was intolerant to metformin so former PCP prescribed her Faxiga 10 mg daily which lowered her A1c to 6.0%. she stopped taking this because he A1c was stable and was controlled with her diet.     Anxiety/insomnia-chronic, stable with current medications: propranolol 10 mg as needed and trazodone 50 mg nightly, although she states she only takes the trazodone as needed as well.     Rash to both elbows- first noticed this rash back in September. States it appeared after she was discharged from the hospital. At this time the rash was all over her body and states it eventually went away on its own. The rash to her elbows reappeared about 1 month ago and hasn't gone away. States the rash it not itchy or painful and there is no drainage.       Past Medical History:   Diagnosis Date    Acid reflux     Anxiety     Gestational diabetes     diet controlled    Polyhydramnios     PTSD (post-traumatic stress disorder)      Past Surgical History:   Procedure Laterality Date     SECTION PRIMARY  2018     SECTION WITH TUBAL N/A 2018    Procedure:  SECTION REPEAT WITH TUBAL;  Surgeon: Juan Avilez MD;  Location: [facility] LABOR DELIVERY;  Service: Obstetrics/Gynecology    TUBAL ABDOMINAL LIGATION  2018    WISDOM TOOTH EXTRACTION         Current Outpatient Medications:     ASHWAGANDHA PO, Take  by mouth. For anxiety, taking once a day, Disp: , Rfl:     propranolol (INDERAL) 10  "MG tablet, TAKE 1 TABLET BY MOUTH DAILY AS NEEDED FOR ANXIETY, Disp: 30 tablet, Rfl: 0    traZODone (DESYREL) 50 MG tablet, Take 1 tablet by mouth Every Night., Disp: 90 tablet, Rfl: 1    dapagliflozin Propanediol (Farxiga) 10 MG tablet, Take 10 mg by mouth Daily., Disp: 30 tablet, Rfl: 5    triamcinolone (KENALOG) 0.1 % ointment, Apply 1 Application topically to the appropriate area as directed 2 (Two) Times a Day., Disp: 80 g, Rfl: 1    vitamin D (ERGOCALCIFEROL) 1.25 MG (99892 UT) capsule capsule, Take 1 capsule by mouth 1 (One) Time Per Week. (Patient not taking: Reported on 1/30/2025), Disp: 8 capsule, Rfl: 0  No Known Allergies    Objective     Vital Signs  /68 (BP Location: Left arm, Patient Position: Sitting, Cuff Size: Adult)   Pulse 90   Temp 97.5 °F (36.4 °C) (Infrared)   Resp 18   Ht 165.7 cm (65.24\")   Wt 84.6 kg (186 lb 9.6 oz)   SpO2 97%   BMI 30.83 kg/m²   Estimated body mass index is 30.83 kg/m² as calculated from the following:    Height as of this encounter: 165.7 cm (65.24\").    Weight as of this encounter: 84.6 kg (186 lb 9.6 oz).            Physical Exam    Physical Exam  Constitutional:       Appearance: Normal appearance. She is not ill-appearing.   Eyes:      Extraocular Movements: Extraocular movements intact.      Conjunctiva/sclera: Conjunctivae normal.      Pupils: Pupils are equal, round, and reactive to light.   Cardiovascular:      Rate and Rhythm: Normal rate and regular rhythm.      Pulses: Normal pulses.      Heart sounds: Normal heart sounds. No murmur heard.  Pulmonary:      Effort: Pulmonary effort is normal. No respiratory distress.      Breath sounds: Normal breath sounds. No wheezing or rhonchi.   Abdominal:      Palpations: Abdomen is soft.      Tenderness: There is no abdominal tenderness.   Skin:     Findings: Rash present.             Comments: Small, plaque rash to bilateral elbows   Neurological:      General: No focal deficit present.      Mental Status: " She is alert and oriented to person, place, and time. Mental status is at baseline.          Results      Results for orders placed or performed in visit on 01/30/25   POC Glycosylated Hemoglobin (Hb A1C)    Collection Time: 01/30/25  2:15 PM    Specimen: Blood   Result Value Ref Range    Hemoglobin A1C 6.4 (A) 4.5 - 5.7 %    Lot Number [id number]     Expiration Date 10/04/2026               Assessment and Plan     Diagnoses and all orders for this visit:    1. Prediabetes (Primary)  -     POC Glycosylated Hemoglobin (Hb A1C)  -     dapagliflozin Propanediol (Farxiga) 10 MG tablet; Take 10 mg by mouth Daily.  Dispense: 30 tablet; Refill: 5    2. History of type 2 diabetes mellitus  -     dapagliflozin Propanediol (Farxiga) 10 MG tablet; Take 10 mg by mouth Daily.  Dispense: 30 tablet; Refill: 5    3. Rash  -     triamcinolone (KENALOG) 0.1 % ointment; Apply 1 Application topically to the appropriate area as directed 2 (Two) Times a Day.  Dispense: 80 g; Refill: 1    4. MARKUS (generalized anxiety disorder)    5. Primary insomnia    Prediabetes-A1c has increased to 6.4%. will go ahead and restart farxiga as A1c has gradually increased over the past year. Provided with 1 month of samples. Encouraged her to continue working on healthy diet and exercise. Will f/u in 3 months for A1c check.  Steroid ointment for rash   Anxiety/insomnia stable-continue current medications as prescribed. F/u as directed  Assessment & Plan       If labs or images are ordered we will contact you with the results within the next week.  If you have not heard from us after a week please call our office to inquire about the results.    Follow Up  Return in about 3 months (around 4/30/2025) for a1c check.    MAYDA Merlos

## 2025-02-03 ENCOUNTER — PRIOR AUTHORIZATION (OUTPATIENT)
Dept: INTERNAL MEDICINE | Facility: CLINIC | Age: 30
End: 2025-02-03
Payer: COMMERCIAL

## 2025-02-03 NOTE — TELEPHONE ENCOUNTER
Received determination from insurance company.     Approved     The request has been approved. The authorization is effective from 02/03/2025 to 02/03/2026, as long as the member is enrolled in their current health plan.    Notified Radha of approval.

## 2025-04-25 ENCOUNTER — TELEPHONE (OUTPATIENT)
Dept: INTERNAL MEDICINE | Facility: CLINIC | Age: 30
End: 2025-04-25
Payer: COMMERCIAL

## 2025-04-25 NOTE — TELEPHONE ENCOUNTER
PATIENT HAS CALLED REQUESTING IF SHE CAN CHANGE HER PCP FROM LORIE HOLM TO [facility]. PATIENT STATES SHE HAD ALREADY REQUESTED IN THE PAST TO SWITCH PROVIDERS AND IT HAS YET TO BE ADDRESSED. PATIENT IS FINE WITH APPOINTMENT THAT IS SCHEDULED WITH LORIE IN MAY BUT IS REQUESTING AFTER THAT TO BE ESTABLISHED WITH [facility]. PATIENT IS REQUESTING A CALL BACK EITHER WAY TO LET HER KNOW IF PCP CHANGE IS APPROVED.    CALL BACK NUMBER [phone]

## 2025-04-28 NOTE — TELEPHONE ENCOUNTER
Confirmed with provider this change is accepted, called patient to confirm the change. PT stated understanding. Updated PCP in EPIC.

## 2025-05-01 ENCOUNTER — OFFICE VISIT (OUTPATIENT)
Dept: INTERNAL MEDICINE | Facility: CLINIC | Age: 30
End: 2025-05-01
Payer: COMMERCIAL

## 2025-05-01 VITALS
OXYGEN SATURATION: 100 % | WEIGHT: 180.2 LBS | BODY MASS INDEX: 30.02 KG/M2 | RESPIRATION RATE: 10 BRPM | DIASTOLIC BLOOD PRESSURE: 84 MMHG | TEMPERATURE: 98.7 F | HEIGHT: 65 IN | HEART RATE: 91 BPM | SYSTOLIC BLOOD PRESSURE: 122 MMHG

## 2025-05-01 DIAGNOSIS — F41.9 ANXIETY: ICD-10-CM

## 2025-05-01 DIAGNOSIS — R73.03 PREDIABETES: Primary | ICD-10-CM

## 2025-05-01 DIAGNOSIS — Z86.39 HISTORY OF TYPE 2 DIABETES MELLITUS: ICD-10-CM

## 2025-05-01 DIAGNOSIS — L40.9 PSORIASIS: ICD-10-CM

## 2025-05-01 PROBLEM — F19.94 SUBSTANCE INDUCED MOOD DISORDER: Status: ACTIVE | Noted: 2024-09-08

## 2025-05-01 PROBLEM — F12.90 CANNABIS USE DISORDER: Status: ACTIVE | Noted: 2020-06-24

## 2025-05-01 PROBLEM — F43.10 PTSD (POST-TRAUMATIC STRESS DISORDER): Status: ACTIVE | Noted: 2020-06-24

## 2025-05-01 PROBLEM — F14.10 COCAINE USE DISORDER: Status: ACTIVE | Noted: 2020-10-29

## 2025-05-01 PROBLEM — I10 ESSENTIAL (PRIMARY) HYPERTENSION: Status: ACTIVE | Noted: 2023-05-09

## 2025-05-01 PROBLEM — F10.90 ALCOHOL USE DISORDER: Status: ACTIVE | Noted: 2024-09-08

## 2025-05-01 PROBLEM — F19.90 SUBSTANCE USE DISORDER: Status: ACTIVE | Noted: 2024-10-13

## 2025-05-01 LAB
EXPIRATION DATE: ABNORMAL
HBA1C MFR BLD: 6.3 % (ref 4.5–5.7)
Lab: ABNORMAL

## 2025-05-01 RX ORDER — DAPAGLIFLOZIN 10 MG/1
10 TABLET, FILM COATED ORAL DAILY
Qty: 30 TABLET | Refills: 5 | Status: SHIPPED | OUTPATIENT
Start: 2025-05-01

## 2025-05-06 ENCOUNTER — OFFICE VISIT (OUTPATIENT)
Dept: OBSTETRICS AND GYNECOLOGY | Facility: CLINIC | Age: 30
End: 2025-05-06
Payer: COMMERCIAL

## 2025-05-06 VITALS
SYSTOLIC BLOOD PRESSURE: 118 MMHG | BODY MASS INDEX: 30.02 KG/M2 | DIASTOLIC BLOOD PRESSURE: 82 MMHG | WEIGHT: 180.2 LBS | HEIGHT: 65 IN

## 2025-05-06 DIAGNOSIS — E55.9 VITAMIN D DEFICIENCY: ICD-10-CM

## 2025-05-06 DIAGNOSIS — N92.0 MENORRHAGIA WITH REGULAR CYCLE: ICD-10-CM

## 2025-05-06 DIAGNOSIS — Z12.4 ENCOUNTER FOR PAPANICOLAOU SMEAR FOR CERVICAL CANCER SCREENING: Primary | ICD-10-CM

## 2025-05-06 DIAGNOSIS — Z01.419 ENCOUNTER FOR ANNUAL ROUTINE GYNECOLOGICAL EXAMINATION: ICD-10-CM

## 2025-05-06 NOTE — PROGRESS NOTES
Gynecologic Annual Exam Note        Establish Care, Gynecologic Exam, Dysmenorrhea, and Menorrhagia        Subjective     HPI  Marcela Perales is a 30 y.o.  female who presents for annual well woman exam as a new patient. . Patient's last menstrual period was 2025 (approximate). Her periods occur every 28-30 days, lasting 5-6 days.  The flow is heavy. She reports dysmenorrhea is severe occurring premenstrually, about 2 weeks before. Marital Status: .  She is not currently sexually active. STD testing recommendations have been explained to the patient and she does not desire STD testing.    The patient would like to discuss the following complaints today: severe cramping with sharp pains that occur about 2 weeks before menses and heavy menses where she does saturate 1 pad <1 hour and all of this has been present since her tubal ligation in 2018.    Additional OB/GYN History   contraceptive methods: Abstinence, tubal   Desires to: do not start contraception  Thromboembolic Disease: none  History of migraines: no  Age of menarche: 11    History of STD: yes GC/CHLAMYDIA (trich)    Last Pap : 2021. Results: negative. HPV:  not done .   Last Completed Pap Smear            Awaiting Completion       PAP SMEAR (Every 3 Years) Order placed this encounter      2025  Order placed for LIQUID-BASED PAP SMEAR WITH HPV GENOTYPING REGARDLESS OF INTERPRETATION ([facility]) by Parvin Padilla MA    2023  Patient-Reported (Performed Externally) - per GYN    2021  Liquid-based Pap Smear, Screening    2018  Patient-Reported (Performed Externally)                             History of abnormal Pap smear: no  Gardasil status: did not receive  Family history of uterine, colon, breast, or ovarian cancer: no  Performs monthly Self-Breast Exam: no  Exercises Regularly:yes  Feelings of Anxiety or Depression: yes - both  Tobacco Usage?: No       Current Outpatient Medications:      dapagliflozin Propanediol (Farxiga) 10 MG tablet, Take 10 mg by mouth Daily., Disp: 30 tablet, Rfl: 5    propranolol (INDERAL) 10 MG tablet, TAKE 1 TABLET BY MOUTH DAILY AS NEEDED FOR ANXIETY, Disp: 30 tablet, Rfl: 0    traZODone (DESYREL) 50 MG tablet, Take 1 tablet by mouth Every Night., Disp: 90 tablet, Rfl: 1    triamcinolone (KENALOG) 0.1 % ointment, Apply 1 Application topically to the appropriate area as directed 2 (Two) Times a Day., Disp: 80 g, Rfl: 1    ASHWAGANDHA PO, Take  by mouth. For anxiety, taking once a day, Disp: , Rfl:      Patient denies the need for medication refills today.    OB History          2    Para   2    Term   1       1    AB   0    Living   2         SAB   0    IAB   0    Ectopic   0    Molar   0    Multiple   0    Live Births   2                Health Maintenance   Topic Date Due    DIABETIC FOOT EXAM  Never done    DIABETIC EYE EXAM  Never done    URINE MICROALBUMIN-CREATININE RATIO (uACR)  Never done    Hepatitis B (1 of 3 - 19+ 3-dose series) Never done    Annual Gynecologic Pelvic and Breast Exam  2022    Pneumococcal Vaccine 0-49 (1 of 2 - PCV) 10/22/2025 (Originally 3/16/2014)    TDAP/TD VACCINES (1 - Tdap) 10/22/2025 (Originally 3/16/2014)    COVID-19 Vaccine (1 - - season) 2025 (Originally 2024)    INFLUENZA VACCINE  2025    ANNUAL PHYSICAL  10/23/2025    HEMOGLOBIN A1C  2025    PAP SMEAR  2026    HEPATITIS C SCREENING  Completed       Past Medical History:   Diagnosis Date    Acid reflux     Anxiety     Gestational diabetes     diet controlled    Polyhydramnios     PTSD (post-traumatic stress disorder)         Past Surgical History:   Procedure Laterality Date     SECTION PRIMARY  2018     SECTION WITH TUBAL N/A 2018    Procedure:  SECTION REPEAT WITH TUBAL;  Surgeon: Juan Avilez MD;  Location: [facility] LABOR DELIVERY;  Service: Obstetrics/Gynecology    TUBAL ABDOMINAL LIGATION   "2018    WISDOM TOOTH EXTRACTION  2014       The additional following portions of the patient's history were reviewed and updated as appropriate: allergies, current medications, past family history, past medical history, past social history, past surgical history, and problem list.    Review of Systems   Constitutional: Negative.    Respiratory: Negative.     Cardiovascular: Negative.    Gastrointestinal: Negative.    Genitourinary:  Positive for menstrual problem. Negative for breast discharge, breast lump, breast pain and pelvic pain.   Musculoskeletal: Negative.    Neurological: Negative.    Psychiatric/Behavioral: Negative.           I have reviewed and agree with the HPI, ROS, and historical information as entered above. Deni Peters MD          Objective   /82   Ht 165.4 cm (65.11\")   Wt 81.7 kg (180 lb 3.2 oz)   LMP 04/27/2025 (Approximate)   Breastfeeding No   BMI 29.89 kg/m²     Physical Exam  Vitals reviewed. Exam conducted with a chaperone present.   Constitutional:       Appearance: Normal appearance.   HENT:      Head: Normocephalic and atraumatic.   Cardiovascular:      Rate and Rhythm: Normal rate and regular rhythm.   Pulmonary:      Breath sounds: Normal breath sounds.   Chest:   Breasts:     Right: Normal.      Left: Normal.   Abdominal:      General: Abdomen is flat.      Palpations: Abdomen is soft.   Genitourinary:     General: Normal vulva.      Vagina: Normal.      Cervix: Normal.      Uterus: Normal.       Adnexa: Right adnexa normal and left adnexa normal.   Musculoskeletal:      Cervical back: Neck supple.   Skin:     General: Skin is warm and dry.   Neurological:      Mental Status: She is alert and oriented to person, place, and time.   Psychiatric:         Mood and Affect: Mood normal.         Behavior: Behavior normal.            Assessment and Plan    Problem List Items Addressed This Visit    None  Visit Diagnoses         Encounter for Papanicolaou smear for " cervical cancer screening    -  Primary    Relevant Orders    LIQUID-BASED PAP SMEAR WITH HPV GENOTYPING REGARDLESS OF INTERPRETATION ([facility])      Encounter for annual routine gynecological examination        Relevant Orders    LIQUID-BASED PAP SMEAR WITH HPV GENOTYPING REGARDLESS OF INTERPRETATION ([facility])      Menorrhagia with regular cycle        Relevant Orders    CBC (No Diff)    Comprehensive Metabolic Panel    Hemoglobin A1c    TSH    US Non-ob Transvaginal      Vitamin D deficiency        Relevant Orders    Vitamin D,25-Hydroxy            GYN annual well woman exam.   Reviewed pap guidelines.   Encouraged use of condoms for STD prevention.  Reviewed monthly self breast exams.  Instructed to call with lumps, pain, or breast discharge.    Reviewed exercise as a preventative health measures.   Reccommended Flu Vaccine in Fall of each year.  Age appropriate labs drawn today an will f/u with US  Return in about 3 weeks (around 5/27/2025) for GYN visit and US.    Deni Peters MD  05/06/2025

## 2025-05-07 LAB
25(OH)D3+25(OH)D2 SERPL-MCNC: 23.6 NG/ML (ref 30–100)
ALBUMIN SERPL-MCNC: 4.1 G/DL (ref 3.5–5.2)
ALBUMIN/GLOB SERPL: 1.4 G/DL
ALP SERPL-CCNC: 63 U/L (ref 39–117)
ALT SERPL-CCNC: 14 U/L (ref 1–33)
AST SERPL-CCNC: 13 U/L (ref 1–32)
BILIRUB SERPL-MCNC: 0.2 MG/DL (ref 0–1.2)
BUN SERPL-MCNC: 12 MG/DL (ref 6–20)
BUN/CREAT SERPL: 13.6 (ref 7–25)
CALCIUM SERPL-MCNC: 9.2 MG/DL (ref 8.6–10.5)
CHLORIDE SERPL-SCNC: 104 MMOL/L (ref 98–107)
CO2 SERPL-SCNC: 24.3 MMOL/L (ref 22–29)
CREAT SERPL-MCNC: 0.88 MG/DL (ref 0.57–1)
EGFRCR SERPLBLD CKD-EPI 2021: 90.8 ML/MIN/1.73
ERYTHROCYTE [DISTWIDTH] IN BLOOD BY AUTOMATED COUNT: 14 % (ref 12.3–15.4)
GLOBULIN SER CALC-MCNC: 2.9 GM/DL
GLUCOSE SERPL-MCNC: 109 MG/DL (ref 65–99)
HBA1C MFR BLD: 6.3 % (ref 4.8–5.6)
HCT VFR BLD AUTO: 35.8 % (ref 34–46.6)
HGB BLD-MCNC: 11.5 G/DL (ref 12–15.9)
MCH RBC QN AUTO: 25.9 PG (ref 26.6–33)
MCHC RBC AUTO-ENTMCNC: 32.1 G/DL (ref 31.5–35.7)
MCV RBC AUTO: 80.6 FL (ref 79–97)
PLATELET # BLD AUTO: 398 10*3/MM3 (ref 140–450)
POTASSIUM SERPL-SCNC: 4.5 MMOL/L (ref 3.5–5.2)
PROT SERPL-MCNC: 7 G/DL (ref 6–8.5)
RBC # BLD AUTO: 4.44 10*6/MM3 (ref 3.77–5.28)
SODIUM SERPL-SCNC: 138 MMOL/L (ref 136–145)
TSH SERPL DL<=0.005 MIU/L-ACNC: 0.67 UIU/ML (ref 0.27–4.2)
WBC # BLD AUTO: 6.08 10*3/MM3 (ref 3.4–10.8)

## 2025-05-07 RX ORDER — ERGOCALCIFEROL 1.25 MG/1
50000 CAPSULE ORAL WEEKLY
Qty: 4 CAPSULE | Refills: 2 | Status: SHIPPED | OUTPATIENT
Start: 2025-05-07 | End: 2025-08-05

## 2025-05-08 LAB — REF LAB TEST METHOD: NORMAL

## 2025-05-27 ENCOUNTER — OFFICE VISIT (OUTPATIENT)
Dept: OBSTETRICS AND GYNECOLOGY | Facility: CLINIC | Age: 30
End: 2025-05-27
Payer: COMMERCIAL

## 2025-05-27 VITALS
WEIGHT: 178.2 LBS | DIASTOLIC BLOOD PRESSURE: 72 MMHG | HEIGHT: 65 IN | BODY MASS INDEX: 29.69 KG/M2 | SYSTOLIC BLOOD PRESSURE: 116 MMHG

## 2025-05-27 DIAGNOSIS — N92.1 MENORRHAGIA WITH IRREGULAR CYCLE: ICD-10-CM

## 2025-05-27 DIAGNOSIS — D25.1 INTRAMURAL AND SUBSEROUS LEIOMYOMA OF UTERUS: ICD-10-CM

## 2025-05-27 DIAGNOSIS — Z31.0 ENCOUNTER FOR REVERSAL OF PREVIOUS STERILIZATION: Primary | ICD-10-CM

## 2025-05-27 DIAGNOSIS — D25.2 INTRAMURAL AND SUBSEROUS LEIOMYOMA OF UTERUS: ICD-10-CM

## 2025-05-27 NOTE — PROGRESS NOTES
Chief Complaint   Patient presents with    Gynecologic Exam    TVU today    Menorrhagia     Follow up         Subjective   HPI  Marcela Perales is a 30 y.o. female, , her last LMP was Patient's last menstrual period was 2025 (approximate)..  She presents for a follow up evaluation of Menorrhagia. The patient was last seen  3 weeks ago by Deni Peters MD. At that time she reported severe cramping with sharp pains that occur about 2 weeks before menses and heavy menses where she does saturate 1 pad <1 hour and all of this has been present since her tubal ligation in 2018. She had labs drawn. The plan was  to draw labs and follow up with TVU in 3 weeks . Since the last visit the patient reports her cramping and heavy bleeding has worsened. This has not been an issue in the past until her last OV. The patient reports additional symptoms as none.      US was done today. Enlarged uterus with multiple fibroids present. Discussed findings and treatment options. She declines hysterectomy and would like to proceed with tubal reversal.       Additional OB/GYN History   Last Pap : 2025, negative, HPV negative  Last Completed Pap Smear            Upcoming       PAP SMEAR (Every 3 Years) Next due on 2025  LIQUID-BASED PAP SMEAR WITH HPV GENOTYPING REGARDLESS OF INTERPRETATION ([facility])    2023  Patient-Reported (Performed Externally) - per GYN    2021  Liquid-based Pap Smear, Screening    2018  Patient-Reported (Performed Externally)                          History of abnormal Pap smear: no  Tobacco Usage?: No       Current Outpatient Medications:     ASHWAGANDHA PO, Take  by mouth. For anxiety, taking once a day, Disp: , Rfl:     dapagliflozin Propanediol (Farxiga) 10 MG tablet, Take 10 mg by mouth Daily., Disp: 30 tablet, Rfl: 5    ergocalciferol (ERGOCALCIFEROL) 1.25 MG (05590 UT) capsule, Take 1 capsule by mouth 1 (One) Time Per Week for 90  "days., Disp: 4 capsule, Rfl: 2    propranolol (INDERAL) 10 MG tablet, TAKE 1 TABLET BY MOUTH DAILY AS NEEDED FOR ANXIETY, Disp: 30 tablet, Rfl: 0    traZODone (DESYREL) 50 MG tablet, Take 1 tablet by mouth Every Night., Disp: 90 tablet, Rfl: 1    triamcinolone (KENALOG) 0.1 % ointment, Apply 1 Application topically to the appropriate area as directed 2 (Two) Times a Day., Disp: 80 g, Rfl: 1     Past Medical History:   Diagnosis Date    Acid reflux     Anxiety     Gestational diabetes     diet controlled    Polyhydramnios     PTSD (post-traumatic stress disorder)         Past Surgical History:   Procedure Laterality Date     SECTION PRIMARY  2018     SECTION WITH TUBAL N/A 2018    Procedure:  SECTION REPEAT WITH TUBAL;  Surgeon: Juan Avilez MD;  Location: [facility] LABOR DELIVERY;  Service: Obstetrics/Gynecology    TUBAL ABDOMINAL LIGATION      WISDOM TOOTH EXTRACTION         The additional following portions of the patient's history were reviewed and updated as appropriate: allergies, current medications, past family history, past medical history, past social history, past surgical history, and problem list.    Review of Systems   Constitutional: Negative.    Respiratory: Negative.     Cardiovascular: Negative.    Gastrointestinal: Negative.    Genitourinary:  Positive for menstrual problem. Negative for pelvic pain.   Musculoskeletal: Negative.    Neurological: Negative.    Psychiatric/Behavioral: Negative.         I have reviewed and agree with the HPI, ROS, and historical information as entered above. Deni Peters MD      Objective   /72   Ht 165.4 cm (65.12\")   Wt 80.8 kg (178 lb 3.2 oz)   LMP 2025 (Approximate)   Breastfeeding No   BMI 29.55 kg/m²     Physical Exam  Vitals reviewed.   Constitutional:       Appearance: Normal appearance.   HENT:      Head: Normocephalic.   Abdominal:      General: Abdomen is flat.      Palpations: Abdomen " is soft.   Skin:     General: Skin is warm and dry.   Neurological:      Mental Status: She is alert and oriented to person, place, and time.   Psychiatric:         Mood and Affect: Mood normal.         Behavior: Behavior normal.         Assessment & Plan     Assessment     Problem List Items Addressed This Visit       Intramural and subserous leiomyoma of uterus    Menorrhagia with irregular cycle     Other Visit Diagnoses         Encounter for reversal of previous sterilization    -  Primary    Relevant Orders    Ambulatory Referral to Infertility (Completed)              Plan     Call for heavy bleeding  Return PRN  Referral made to Dr. Sy to discuss options of tubal reversal. I explained that this would not likely improve her periods.       Deni Peters MD  05/27/2025

## (undated) DEVICE — SOL IRR NACL 0.9PCT BT 1000ML

## (undated) DEVICE — SUT GUT CHRM 1 CTX 36IN 905H

## (undated) DEVICE — PROXIMATE RH ROTATING HEAD SKIN STAPLERS (35 WIDE) CONTAINS 35 STAINLESS STEEL STAPLES: Brand: PROXIMATE

## (undated) DEVICE — MAT PREVALON MOBL TRANSFR AIR WO/PAD 39X80IN

## (undated) DEVICE — SOL IRR H2O BTL 1000ML STRL

## (undated) DEVICE — COATED VICRYL  (POLYGLACTIN 910) SUTURE, VIOLET BRAIDED, STERILE, SYNTHETIC ABSORBABLE SUTURE: Brand: COATED VICRYL

## (undated) DEVICE — TRY SPINE BLCK WHITACRE 25G 3X5IN

## (undated) DEVICE — GLV SURG BIOGEL LTX PF 7 1/2

## (undated) DEVICE — SUT VIC 2/0 CT1 27IN J339H BX/36

## (undated) DEVICE — PK C/SECT 10

## (undated) DEVICE — SUT PLAIN  3/0 CT1 27IN 842H